# Patient Record
Sex: MALE | Race: WHITE | NOT HISPANIC OR LATINO | Employment: FULL TIME | URBAN - METROPOLITAN AREA
[De-identification: names, ages, dates, MRNs, and addresses within clinical notes are randomized per-mention and may not be internally consistent; named-entity substitution may affect disease eponyms.]

---

## 2019-07-18 ENCOUNTER — HOSPITAL ENCOUNTER (OUTPATIENT)
Dept: GENERAL RADIOLOGY | Facility: HOSPITAL | Age: 64
Discharge: HOME | End: 2019-07-18
Attending: NEUROLOGICAL SURGERY

## 2020-03-26 ENCOUNTER — HOSPITAL ENCOUNTER (OUTPATIENT)
Dept: URGENT CARE | Facility: CLINIC | Age: 65
Discharge: HOME | End: 2020-03-26
Attending: PHYSICIAN ASSISTANT

## 2020-10-30 ENCOUNTER — HOSPITAL ENCOUNTER (OUTPATIENT)
Dept: OTHER | Facility: HOSPITAL | Age: 65
Discharge: HOME | End: 2020-10-30
Attending: NEUROLOGICAL SURGERY

## 2020-10-30 LAB
ALBUMIN SERPL-MCNC: 4.5 G/DL (ref 3.5–5)
ALBUMIN/GLOB SERPL: 1.6 {RATIO} (ref 1.4–2.6)
ALP SERPL-CCNC: 36 U/L (ref 56–155)
ALT SERPL-CCNC: 27 U/L (ref 10–40)
ANION GAP SERPL CALC-SCNC: 16 MMOL/L (ref 8–19)
APPEARANCE UR: CLEAR
AST SERPL-CCNC: 21 U/L (ref 15–50)
BASOPHILS # BLD AUTO: 0.03 10*3/UL (ref 0–0.2)
BASOPHILS NFR BLD AUTO: 0.5 % (ref 0–3)
BILIRUB SERPL-MCNC: 0.31 MG/DL (ref 0.2–1.3)
BILIRUB UR QL: NEGATIVE
BUN SERPL-MCNC: 15 MG/DL (ref 5–25)
BUN/CREAT SERPL: 11 {RATIO} (ref 6–20)
CALCIUM SERPL-MCNC: 9.1 MG/DL (ref 8.7–10.4)
CHLORIDE SERPL-SCNC: 97 MMOL/L (ref 99–111)
COLOR UR: YELLOW
CONV ABS IMM GRAN: 0.04 10*3/UL (ref 0–0.2)
CONV CO2: 27 MMOL/L (ref 22–32)
CONV COLLECTION SOURCE (UA): ABNORMAL
CONV IMMATURE GRAN: 0.6 % (ref 0–1.8)
CONV TOTAL PROTEIN: 7.4 G/DL (ref 6.3–8.2)
CONV UROBILINOGEN IN URINE BY AUTOMATED TEST STRIP: 0.2 {EHRLICHU}/DL (ref 0.1–1)
CREAT UR-MCNC: 1.35 MG/DL (ref 0.7–1.2)
DEPRECATED RDW RBC AUTO: 37.3 FL (ref 35.1–43.9)
EOSINOPHIL # BLD AUTO: 0.36 10*3/UL (ref 0–0.7)
EOSINOPHIL # BLD AUTO: 5.8 % (ref 0–7)
ERYTHROCYTE [DISTWIDTH] IN BLOOD BY AUTOMATED COUNT: 12.4 % (ref 11.6–14.4)
EST. AVERAGE GLUCOSE BLD GHB EST-MCNC: 183 MG/DL
GFR SERPLBLD BASED ON 1.73 SQ M-ARVRAT: 54 ML/MIN/{1.73_M2}
GLOBULIN UR ELPH-MCNC: 2.9 G/DL (ref 2–3.5)
GLUCOSE SERPL-MCNC: 234 MG/DL (ref 70–99)
GLUCOSE UR QL: 250 MG/DL
HBA1C MFR BLD: 8 % (ref 3.5–5.7)
HCT VFR BLD AUTO: 39.7 % (ref 42–52)
HGB BLD-MCNC: 13.5 G/DL (ref 14–18)
HGB UR QL STRIP: NEGATIVE
KETONES UR QL STRIP: NEGATIVE MG/DL
LEUKOCYTE ESTERASE UR QL STRIP: NEGATIVE
LYMPHOCYTES # BLD AUTO: 1.7 10*3/UL (ref 1–5)
LYMPHOCYTES NFR BLD AUTO: 27.3 % (ref 20–45)
MCH RBC QN AUTO: 28.8 PG (ref 27–31)
MCHC RBC AUTO-ENTMCNC: 34 G/DL (ref 33–37)
MCV RBC AUTO: 84.6 FL (ref 80–96)
MONOCYTES # BLD AUTO: 0.52 10*3/UL (ref 0.2–1.2)
MONOCYTES NFR BLD AUTO: 8.4 % (ref 3–10)
NEUTROPHILS # BLD AUTO: 3.57 10*3/UL (ref 2–8)
NEUTROPHILS NFR BLD AUTO: 57.4 % (ref 30–85)
NITRITE UR QL STRIP: NEGATIVE
NRBC CBCN: 0 % (ref 0–0.7)
OSMOLALITY SERPL CALC.SUM OF ELEC: 290 MOSM/KG (ref 273–304)
PH UR STRIP.AUTO: 5.5 [PH] (ref 5–8)
PLATELET # BLD AUTO: 211 10*3/UL (ref 130–400)
PMV BLD AUTO: 10.6 FL (ref 9.4–12.4)
POTASSIUM SERPL-SCNC: 4.2 MMOL/L (ref 3.5–5.3)
PROT UR QL: NEGATIVE MG/DL
RBC # BLD AUTO: 4.69 10*6/UL (ref 4.7–6.1)
SODIUM SERPL-SCNC: 136 MMOL/L (ref 135–147)
SP GR UR: 1.01 (ref 1–1.03)
WBC # BLD AUTO: 6.22 10*3/UL (ref 4.8–10.8)

## 2021-03-19 ENCOUNTER — HOSPITAL ENCOUNTER (OUTPATIENT)
Dept: VACCINE CLINIC | Facility: HOSPITAL | Age: 66
Discharge: HOME | End: 2021-03-19
Attending: INTERNAL MEDICINE

## 2021-04-16 ENCOUNTER — HOSPITAL ENCOUNTER (OUTPATIENT)
Dept: VACCINE CLINIC | Facility: HOSPITAL | Age: 66
Discharge: HOME | End: 2021-04-16
Attending: INTERNAL MEDICINE

## 2021-06-25 ENCOUNTER — OFFICE VISIT (OUTPATIENT)
Dept: UROLOGY | Facility: CLINIC | Age: 66
End: 2021-06-25

## 2021-06-25 VITALS
HEIGHT: 70 IN | DIASTOLIC BLOOD PRESSURE: 45 MMHG | HEART RATE: 117 BPM | BODY MASS INDEX: 30.49 KG/M2 | SYSTOLIC BLOOD PRESSURE: 139 MMHG | TEMPERATURE: 97.8 F | WEIGHT: 213 LBS

## 2021-06-25 DIAGNOSIS — R33.9 URINARY RETENTION: Primary | ICD-10-CM

## 2021-06-25 PROBLEM — IMO0002 DIABETES MELLITUS TYPE II, UNCONTROLLED: Status: ACTIVE | Noted: 2021-03-11

## 2021-06-25 PROBLEM — M48.061 SPINAL STENOSIS OF LUMBAR REGION: Status: ACTIVE | Noted: 2020-01-29

## 2021-06-25 PROBLEM — M43.16 SPONDYLOLISTHESIS OF LUMBAR REGION: Status: ACTIVE | Noted: 2019-09-14

## 2021-06-25 PROBLEM — S33.5XXA LUMBAR SPRAIN: Status: ACTIVE | Noted: 2019-05-16

## 2021-06-25 LAB
BACTERIA UR QL AUTO: NORMAL /HPF
BILIRUB BLD-MCNC: NEGATIVE MG/DL
CLARITY, POC: CLEAR
COLOR UR: YELLOW
EPI CELLS #/AREA URNS HPF: 0 /[HPF]
GLUCOSE UR STRIP-MCNC: NEGATIVE MG/DL
KETONES UR QL: NEGATIVE
LEUKOCYTE EST, POC: NEGATIVE
NITRITE UR-MCNC: NEGATIVE MG/ML
PH UR: 6.5 [PH] (ref 5–8)
PROT UR STRIP-MCNC: NEGATIVE MG/DL
RBC # UR STRIP: NEGATIVE /UL
RBC # UR STRIP: NORMAL /HPF
RENAL EPITHELIAL, POC: 0
SP GR UR: 1.02 (ref 1–1.03)
UNIDENT CRYS URNS QL MICRO: NORMAL /HPF
UROBILINOGEN UR QL: NORMAL
WBC # UR STRIP: NORMAL /HPF

## 2021-06-25 PROCEDURE — 99203 OFFICE O/P NEW LOW 30 MIN: CPT | Performed by: UROLOGY

## 2021-06-25 PROCEDURE — 51702 INSERT TEMP BLADDER CATH: CPT | Performed by: UROLOGY

## 2021-06-25 PROCEDURE — 81003 URINALYSIS AUTO W/O SCOPE: CPT | Performed by: UROLOGY

## 2021-06-25 RX ORDER — ERGOCALCIFEROL 1.25 MG/1
50000 CAPSULE ORAL WEEKLY
COMMUNITY
Start: 2021-04-07

## 2021-06-25 RX ORDER — BLOOD SUGAR DIAGNOSTIC
STRIP MISCELLANEOUS
COMMUNITY
Start: 2021-04-27

## 2021-06-25 RX ORDER — LINAGLIPTIN 5 MG/1
5 TABLET, FILM COATED ORAL DAILY
COMMUNITY
Start: 2021-06-07

## 2021-06-25 RX ORDER — ACETAMINOPHEN 500 MG
1200 TABLET ORAL DAILY
COMMUNITY
Start: 2021-03-11

## 2021-06-25 RX ORDER — CYCLOBENZAPRINE HCL 10 MG
10 TABLET ORAL
COMMUNITY
Start: 2021-06-16

## 2021-06-25 RX ORDER — PRAVASTATIN SODIUM 80 MG/1
80 TABLET ORAL DAILY
COMMUNITY
Start: 2021-05-10

## 2021-06-25 RX ORDER — HYDROCODONE BITARTRATE AND ACETAMINOPHEN 10; 325 MG/1; MG/1
1 TABLET ORAL
COMMUNITY
Start: 2021-06-24 | End: 2021-07-24

## 2021-06-25 RX ORDER — TAMSULOSIN HYDROCHLORIDE 0.4 MG/1
0.4 CAPSULE ORAL
COMMUNITY
Start: 2021-06-17

## 2021-06-25 RX ORDER — LISINOPRIL 20 MG/1
20 TABLET ORAL DAILY
COMMUNITY
Start: 2021-04-27

## 2021-06-25 RX ORDER — CHLORAL HYDRATE 500 MG
CAPSULE ORAL
COMMUNITY

## 2021-06-25 NOTE — PROGRESS NOTES
"Chief Complaint  Urinary Retention.  BPH  Subjective     Patient is postop back surgery and is in excruciating pain.  Could not urinate and had a Mazariegos catheter        Darinel Garza presents to [facility]  History of Present Illness.    65-year-old white male underwent L4-5 S1 back operation 10 days ago and then went into urinary retention.  Never had this problem before.  Patient right now is sitting in chair and is in excruciating pain wants to lie down    Objective Patient is in excruciating pain.  Vital Signs:   /45 (BP Location: Right arm, Patient Position: Sitting, Cuff Size: Adult)   Pulse 117   Temp 97.8 °F (36.6 °C)   Ht 177.8 cm (70\")   Wt 96.6 kg (213 lb)   BMI 30.56 kg/m²     Allergies   Allergen Reactions   • Penicillins Other (See Comments)     Passed out after INJECTION as teenager, no rash, no reactive arthritis, no respiratory or cardiac symptoms. Does not appear to be allergic reaction.      Review of Systems   Constitutional: Positive for activity change and appetite change.        Patient is in pain   HENT: Negative.    Respiratory: Negative.    Cardiovascular: Negative.    Gastrointestinal: Negative.    Genitourinary:        Has a Mazariegos catheter in place   Musculoskeletal: Positive for back pain, gait problem and myalgias.   Skin: Negative.    Neurological: Positive for weakness and light-headedness.   Psychiatric/Behavioral: Negative for agitation, confusion, decreased concentration, self-injury, sleep disturbance and suicidal ideas. The patient is not hyperactive.         Physical Exam  Vitals and nursing note reviewed.   Constitutional:       Comments: Patient is in excruciating pain   HENT:      Head: Normocephalic and atraumatic.      Nose: Nose normal.   Cardiovascular:      Rate and Rhythm: Normal rate and regular rhythm.      Pulses: Normal pulses.      Heart sounds: Normal heart sounds. No murmur heard.   No friction rub.   Pulmonary:      " Effort: No respiratory distress.      Breath sounds: No wheezing, rhonchi or rales.   Abdominal:      General: There is no distension.      Palpations: There is no mass.      Tenderness: There is no abdominal tenderness. There is no right CVA tenderness, left CVA tenderness, guarding or rebound.      Hernia: No hernia is present.   Musculoskeletal:      Comments: Recent back operation and incision is healing well.  Is in the midline of the back from about S2 to L 3   Psychiatric:         Behavior: Behavior normal.         Judgment: Judgment normal.     Patient is in pain and obviously depressed    Patient is uncircumcised penis.  Testicles are normal and so is scrotum.    Prostate gland is just about 25 g and benign  Result Review :            We gave him a trial of catheter removal but the patient could not urinate and we had to reinsert 16 Amzariegos catheter in the urinary bladder.  Going to see him again in 10 days for cystoscopy.     Assessment and Plan    Diagnoses and all orders for this visit:    1. Urinary retention (Primary)  -     POC Urinalysis Dipstick, Automated    BPH    Follow Up   No follow-ups on file.  Patient was given instructions and counseling regarding his condition or for health maintenance advice. Please see specific information pulled into the AVS if appropriate.     Lm Álvarez MD

## 2021-07-02 RX ORDER — AMOXICILLIN 250 MG
1 CAPSULE ORAL
COMMUNITY
Start: 2021-06-16

## 2021-07-02 RX ORDER — FENOFIBRATE 160 MG/1
160 TABLET ORAL DAILY
COMMUNITY
Start: 2021-05-14

## 2021-07-02 RX ORDER — TRIAMCINOLONE ACETONIDE 1 MG/G
CREAM TOPICAL
COMMUNITY
Start: 2021-01-06

## 2021-07-02 RX ORDER — LANCETS 30 GAUGE
1 EACH MISCELLANEOUS 4 TIMES DAILY
COMMUNITY
Start: 2021-03-19

## 2021-07-02 RX ORDER — HYDROCHLOROTHIAZIDE 25 MG/1
25 TABLET ORAL DAILY
COMMUNITY
Start: 2021-04-25

## 2021-07-07 ENCOUNTER — OFFICE VISIT (OUTPATIENT)
Dept: UROLOGY | Facility: CLINIC | Age: 66
End: 2021-07-07

## 2021-07-07 ENCOUNTER — PREP FOR SURGERY (OUTPATIENT)
Dept: OTHER | Facility: HOSPITAL | Age: 66
End: 2021-07-07

## 2021-07-07 DIAGNOSIS — N40.1 URINARY RETENTION DUE TO BENIGN PROSTATIC HYPERPLASIA: Primary | ICD-10-CM

## 2021-07-07 DIAGNOSIS — R33.8 URINARY RETENTION DUE TO BENIGN PROSTATIC HYPERPLASIA: Primary | ICD-10-CM

## 2021-07-07 DIAGNOSIS — R33.9 URINARY RETENTION: Primary | ICD-10-CM

## 2021-07-07 DIAGNOSIS — N13.8 BPH WITH OBSTRUCTION/LOWER URINARY TRACT SYMPTOMS: ICD-10-CM

## 2021-07-07 DIAGNOSIS — N40.1 BPH WITH OBSTRUCTION/LOWER URINARY TRACT SYMPTOMS: ICD-10-CM

## 2021-07-07 PROCEDURE — 52000 CYSTOURETHROSCOPY: CPT | Performed by: UROLOGY

## 2021-07-07 RX ORDER — SODIUM CHLORIDE 0.9 % (FLUSH) 0.9 %
10 SYRINGE (ML) INJECTION EVERY 12 HOURS SCHEDULED
Status: CANCELLED | OUTPATIENT
Start: 2021-07-07

## 2021-07-07 RX ORDER — SODIUM CHLORIDE 0.9 % (FLUSH) 0.9 %
10 SYRINGE (ML) INJECTION AS NEEDED
Status: CANCELLED | OUTPATIENT
Start: 2021-07-07

## 2021-07-07 RX ORDER — ONDANSETRON 2 MG/ML
4 INJECTION INTRAMUSCULAR; INTRAVENOUS EVERY 6 HOURS PRN
Status: CANCELLED | OUTPATIENT
Start: 2021-07-07

## 2021-07-07 RX ORDER — SODIUM CHLORIDE, SODIUM LACTATE, POTASSIUM CHLORIDE, CALCIUM CHLORIDE 600; 310; 30; 20 MG/100ML; MG/100ML; MG/100ML; MG/100ML
100 INJECTION, SOLUTION INTRAVENOUS CONTINUOUS
Status: CANCELLED | OUTPATIENT
Start: 2021-07-07

## 2021-07-07 NOTE — PROGRESS NOTES
Patient could not urinate on the uroflow machine.  We will do ultrasound residual and then reinsert Mazariegos catheter.  Probably have to do a button TURP on the patient

## 2021-07-07 NOTE — PROGRESS NOTES
Cystoscopy    Date/Time: 7/7/2021 2:05 PM  Performed by: Lm Álvarez MD  Authorized by: Lm Álvarez MD   Preparation: Patient was prepped and draped in the usual sterile fashion.  Local anesthesia used: yes    Anesthesia:  Local anesthesia used: yes  Local Anesthetic: lidocaine spray  Anesthetic total: 10 mL    Sedation:  Patient sedated: no             Patient was placed in lithotomy position.  Thorough scrubbing her lower abdomen external genitalia was performed with Hibiclens.  18 Olympus flexible cystoscope was inserted into the urethra which was normal.  Prostate gland is large and obstructive.  Patient has large lateral lobes .  Bladder is trabeculated.  Both ureteral orifices are normal.  Bladder was looked at carefully and I do not see any bladder tumors.  There was no evidence of vesicocolic fistula.  Patient has inflammation of bladder wall with hemorrhages in the wall.  Flexible cystoscope was removed and patient tolerated the procedure very well .  We will do uroflow and ultrasound residual

## 2023-12-08 ENCOUNTER — TRANSCRIBE ORDERS (OUTPATIENT)
Dept: ADMINISTRATIVE | Facility: HOSPITAL | Age: 68
End: 2023-12-08
Payer: MEDICARE

## 2023-12-08 ENCOUNTER — HOSPITAL ENCOUNTER (OUTPATIENT)
Dept: GENERAL RADIOLOGY | Facility: HOSPITAL | Age: 68
Discharge: HOME | End: 2023-12-08
Admitting: PHYSICIAN ASSISTANT
Payer: MEDICARE

## 2023-12-08 DIAGNOSIS — R05.1 ACUTE COUGH: ICD-10-CM

## 2023-12-08 DIAGNOSIS — R05.1 ACUTE COUGH: Primary | ICD-10-CM

## 2023-12-08 PROCEDURE — 71046 X-RAY EXAM CHEST 2 VIEWS: CPT

## 2023-12-18 ENCOUNTER — HOSPITAL ENCOUNTER (OUTPATIENT)
Dept: GENERAL RADIOLOGY | Facility: HOSPITAL | Age: 68
Discharge: HOME | End: 2023-12-18
Admitting: PHYSICIAN ASSISTANT
Payer: MEDICARE

## 2023-12-18 ENCOUNTER — TRANSCRIBE ORDERS (OUTPATIENT)
Dept: GENERAL RADIOLOGY | Facility: HOSPITAL | Age: 68
End: 2023-12-18
Payer: MEDICARE

## 2023-12-18 DIAGNOSIS — J69.0 PNEUMONITIS, ASPIRATION: ICD-10-CM

## 2023-12-18 DIAGNOSIS — J69.0 PNEUMONITIS, ASPIRATION: Primary | ICD-10-CM

## 2023-12-18 PROCEDURE — 71046 X-RAY EXAM CHEST 2 VIEWS: CPT

## 2024-09-23 ENCOUNTER — TELEPHONE (OUTPATIENT)
Dept: GASTROENTEROLOGY | Facility: CLINIC | Age: 69
End: 2024-09-23
Payer: MEDICARE

## 2024-10-16 ENCOUNTER — PREP FOR SURGERY (OUTPATIENT)
Dept: OTHER | Facility: HOSPITAL | Age: 69
End: 2024-10-16
Payer: MEDICARE

## 2024-10-16 ENCOUNTER — CLINICAL SUPPORT (OUTPATIENT)
Dept: GASTROENTEROLOGY | Facility: CLINIC | Age: 69
End: 2024-10-16
Payer: MEDICARE

## 2024-10-16 DIAGNOSIS — Z12.11 ENCOUNTER FOR SCREENING FOR MALIGNANT NEOPLASM OF COLON: Primary | ICD-10-CM

## 2024-10-16 RX ORDER — MELOXICAM 7.5 MG/1
1 TABLET ORAL DAILY
COMMUNITY
Start: 2024-06-25

## 2024-10-16 NOTE — PROGRESS NOTES
Darinel Garza  1955  69 y.o.    Reason for call: Screening Colonoscopy  Prep prescribed: LABS pcp waiting to send LV prep  Prep instructions reviewed with patient and sent to patient via regular mail to the home address on file  Is the patient currently on any injectable or oral medications for weight loss or diabetes? No  Clearance needed? No  If yes, what clearance is needed? N/A  Clearance has been requested from n/a  The patient has been scheduled for: Colonoscopy  After your procedure, you will be contacted with results. Please confirm the best phone # to reach the patient: [phone]  Family history of colon cancer? No  If yes, indicate relative: na  Tentative Procedure Date: 11/21/2024    Family History   Problem Relation Age of Onset    Colon cancer Neg Hx      Past Medical History:   Diagnosis Date    Hypertension     Urinary retention      Allergies   Allergen Reactions    Penicillins Other (See Comments)     Passed out after INJECTION as teenager, no rash, no reactive arthritis, no respiratory or cardiac symptoms. Does not appear to be allergic reaction.     Past Surgical History:   Procedure Laterality Date    BACK SURGERY       Social History     Socioeconomic History    Marital status:    Tobacco Use    Smoking status: Never    Smokeless tobacco: Never   Vaping Use    Vaping status: Never Used   Substance and Sexual Activity    Alcohol use: Yes     Comment: rare       Current Outpatient Medications:     Calcium Carbonate-Vit D-Min (Calcium 1200) 5202-2036 MG-UNIT chewable tablet, Chew 1,200 mg Daily., Disp: , Rfl:     cyclobenzaprine (FLEXERIL) 10 MG tablet, 1 tablet., Disp: , Rfl:     fenofibrate 160 MG tablet, Take 1 tablet by mouth Daily., Disp: , Rfl:     hydroCHLOROthiazide (HYDRODIURIL) 25 MG tablet, Take 1 tablet by mouth Daily., Disp: , Rfl:     lisinopril (PRINIVIL,ZESTRIL) 20 MG tablet, Take 1 tablet by mouth Daily., Disp: , Rfl:     meloxicam (MOBIC) 7.5 MG tablet,  Take 1 tablet by mouth Daily., Disp: , Rfl:     metFORMIN (GLUCOPHAGE) 500 MG tablet, Take 2 tablets by mouth 2 (Two) Times a Day With Meals., Disp: , Rfl:     Omega-3 Fatty Acids (fish oil) 1000 MG capsule capsule, Take  by mouth., Disp: , Rfl:     OneTouch Delica Lancets 30G misc, Inject 1 each under the skin into the appropriate area as directed 4 (Four) Times a Day., Disp: , Rfl:     OneTouch Verio test strip, CHECK BLOOD GLUCOSE FOUR TIMES DAILY, Disp: , Rfl:     pravastatin (PRAVACHOL) 80 MG tablet, Take 1 tablet by mouth Daily., Disp: , Rfl:     sennosides-docusate (PERICOLACE) 8.6-50 MG per tablet, Take 1 tablet by mouth., Disp: , Rfl:     tamsulosin (FLOMAX) 0.4 MG capsule 24 hr capsule, 1 capsule., Disp: , Rfl:     Tradjenta 5 MG tablet tablet, Take 1 tablet by mouth Daily., Disp: , Rfl:     triamcinolone (KENALOG) 0.1 % cream, APPLY EXTERNALLY TO THE AFFECTED AREA TWICE DAILY AS NEEDED FOR ITCHING UNTIL CLEAR, Disp: , Rfl:     vitamin D (ERGOCALCIFEROL) 1.25 MG (55515 UT) capsule capsule, Take 1 capsule by mouth 1 (One) Time Per Week., Disp: , Rfl:

## 2024-11-12 NOTE — PAT
Left message on voicemail with arrival time of  0630    Bring picture ID and insurance card, have  over 18 to give ride home.     Bring medication list but do not take any meds except inhalers that morning. Bring medications with you to the hospital, including inhalers.     Complete prep prior to arrival and call [phone] with any questions.     Come to [facility].

## 2024-11-19 ENCOUNTER — ANESTHESIA EVENT (OUTPATIENT)
Dept: GASTROENTEROLOGY | Facility: HOSPITAL | Age: 69
End: 2024-11-19
Payer: MEDICARE

## 2024-11-19 ENCOUNTER — TELEPHONE (OUTPATIENT)
Dept: GASTROENTEROLOGY | Facility: CLINIC | Age: 69
End: 2024-11-19
Payer: MEDICARE

## 2024-11-19 NOTE — ANESTHESIA PREPROCEDURE EVALUATION
Anesthesia Evaluation     Patient summary reviewed and Nursing notes reviewed   NPO Solid Status: > 8 hours  NPO Liquid Status: > 4 hours           Airway   Mallampati: II  TM distance: >3 FB  Neck ROM: full  No difficulty expected  Dental - normal exam     Pulmonary - normal exam    breath sounds clear to auscultation  Cardiovascular - normal exam  Exercise tolerance: good (4-7 METS)    ECG reviewed  Rhythm: regular  Rate: normal    (+) hypertension well controlled less than 2 medications, hyperlipidemia      Neuro/Psych  (+) numbness (resolved)  GI/Hepatic/Renal/Endo    (+) diabetes mellitus type 2 well controlled    Musculoskeletal     (+) neck pain (relates mobility fine just hurts once in a while)  Abdominal     Abdomen: soft.   Substance History - negative use     OB/GYN          Other        ROS/Med Hx Other: Screening    EKG 10/30/20: HR 70, SR, abnormal R wave progression, early transition, atrial premature complex                      Anesthesia Plan    ASA 2     general   total IV anesthesia  (Total IV Anesthesia    Patient understands anesthesia not responsible for dental damage.  )  intravenous induction     Anesthetic plan, risks, benefits, and alternatives have been provided, discussed and informed consent has been obtained with: patient.  Pre-procedure education provided  Plan discussed with CRNA.      CODE STATUS:

## 2024-11-19 NOTE — TELEPHONE ENCOUNTER
Pt called - has not received bowel prep - Bowel Prep sent to [facility] - verbalized instructions and sent bowel prep instructions via [employer]

## 2024-11-21 ENCOUNTER — HOSPITAL ENCOUNTER (OUTPATIENT)
Facility: HOSPITAL | Age: 69
Setting detail: HOSPITAL OUTPATIENT SURGERY
Discharge: HOME | End: 2024-11-21
Attending: INTERNAL MEDICINE | Admitting: INTERNAL MEDICINE
Payer: MEDICARE

## 2024-11-21 ENCOUNTER — ANESTHESIA (OUTPATIENT)
Dept: GASTROENTEROLOGY | Facility: HOSPITAL | Age: 69
End: 2024-11-21
Payer: MEDICARE

## 2024-11-21 VITALS
HEIGHT: 70 IN | HEART RATE: 68 BPM | TEMPERATURE: 98.6 F | SYSTOLIC BLOOD PRESSURE: 124 MMHG | BODY MASS INDEX: 29.16 KG/M2 | WEIGHT: 203.71 LBS | OXYGEN SATURATION: 95 % | RESPIRATION RATE: 16 BRPM | DIASTOLIC BLOOD PRESSURE: 78 MMHG

## 2024-11-21 LAB — GLUCOSE BLDC GLUCOMTR-MCNC: 203 MG/DL (ref 70–99)

## 2024-11-21 PROCEDURE — 25010000002 LIDOCAINE PF 2% 2 % SOLUTION

## 2024-11-21 PROCEDURE — G0121 COLON CA SCRN NOT HI RSK IND: HCPCS | Performed by: INTERNAL MEDICINE

## 2024-11-21 PROCEDURE — 82948 REAGENT STRIP/BLOOD GLUCOSE: CPT

## 2024-11-21 PROCEDURE — 25010000002 PROPOFOL 10 MG/ML EMULSION

## 2024-11-21 RX ORDER — PROPOFOL 10 MG/ML
VIAL (ML) INTRAVENOUS AS NEEDED
Status: DISCONTINUED | OUTPATIENT
Start: 2024-11-21 | End: 2024-11-21 | Stop reason: SURG

## 2024-11-21 RX ORDER — LIDOCAINE HYDROCHLORIDE 20 MG/ML
INJECTION, SOLUTION EPIDURAL; INFILTRATION; INTRACAUDAL; PERINEURAL AS NEEDED
Status: DISCONTINUED | OUTPATIENT
Start: 2024-11-21 | End: 2024-11-21 | Stop reason: SURG

## 2024-11-21 RX ORDER — SODIUM CHLORIDE, SODIUM LACTATE, POTASSIUM CHLORIDE, CALCIUM CHLORIDE 600; 310; 30; 20 MG/100ML; MG/100ML; MG/100ML; MG/100ML
30 INJECTION, SOLUTION INTRAVENOUS CONTINUOUS
Status: DISCONTINUED | OUTPATIENT
Start: 2024-11-21 | End: 2024-11-21 | Stop reason: HOSPADM

## 2024-11-21 RX ADMIN — LIDOCAINE HYDROCHLORIDE 40 MG: 20 INJECTION, SOLUTION INTRAVENOUS at 08:24

## 2024-11-21 RX ADMIN — PROPOFOL 100 MG: 10 INJECTION, EMULSION INTRAVENOUS at 08:24

## 2024-11-21 RX ADMIN — PROPOFOL 150 MCG/KG/MIN: 10 INJECTION, EMULSION INTRAVENOUS at 08:24

## 2024-11-21 NOTE — ANESTHESIA POSTPROCEDURE EVALUATION
Patient: Darinel Garza    Procedure Summary       Date: 11/21/24 Room / Location: [facility] ENDOSCOPY 1 / [facility] ENDOSCOPY    Anesthesia Start: 0820 Anesthesia Stop: 0849    Procedure: COLONOSCOPY Diagnosis:       Encounter for screening for malignant neoplasm of colon      (Encounter for screening for malignant neoplasm of colon [Z12.11])    Surgeons: Richard Lopez MD Provider: Don Prather CRNA    Anesthesia Type: general ASA Status: 2            Anesthesia Type: general    Vitals  Vitals Value Taken Time   /78 11/21/24 0903   Temp 37 °C (98.6 °F) 11/21/24 0903   Pulse 67 11/21/24 0904   Resp 16 11/21/24 0903   SpO2 96 % 11/21/24 0904   Vitals shown include unfiled device data.        Post Anesthesia Care and Evaluation    Post-procedure mental status: acceptable.  Pain management: satisfactory to patient    Airway patency: patent  Anesthetic complications: No anesthetic complications    Cardiovascular status: acceptable  Respiratory status: acceptable    Comments: Per chart review

## 2024-11-21 NOTE — H&P
Pre Procedure History & Physical    Chief Complaint: Colonoscopy    HPI: Patient is 69 years old male presented today for open access average risk  screening colonoscopy.  He had last colonoscopy about 10 years ago that according to him was reported normal.  No family history of colorectal cancer or colon polyps.  Patient denies intake of blood thinner, anticoagulants or NSAIDs medications    Past Medical History:   Past Medical History:   Diagnosis Date    Diabetes mellitus     Hypertension     Urinary retention        Past Surgical History:  Past Surgical History:   Procedure Laterality Date    BACK SURGERY         Family History:  Family History   Problem Relation Age of Onset    Colon cancer Neg Hx        Social History:   reports that he has never smoked. He has never used smokeless tobacco. He reports current alcohol use.    Medications:   Medications Prior to Admission   Medication Sig Dispense Refill Last Dose/Taking    fenofibrate 160 MG tablet Take 1 tablet by mouth Daily.   11/20/2024 Morning    hydroCHLOROthiazide (HYDRODIURIL) 25 MG tablet Take 1 tablet by mouth Daily.   11/20/2024 Morning    lisinopril (PRINIVIL,ZESTRIL) 20 MG tablet Take 1 tablet by mouth Daily.   11/20/2024 Morning    metFORMIN (GLUCOPHAGE) 500 MG tablet Take 2 tablets by mouth 2 (Two) Times a Day With Meals.   11/20/2024 Morning    OneTouch Delica Lancets 30G misc Inject 1 each under the skin into the appropriate area as directed 4 (Four) Times a Day.   11/20/2024 Morning    OneTouch Verio test strip CHECK BLOOD GLUCOSE FOUR TIMES DAILY   11/20/2024    pravastatin (PRAVACHOL) 80 MG tablet Take 1 tablet by mouth Daily.   11/20/2024 Morning    tamsulosin (FLOMAX) 0.4 MG capsule 24 hr capsule 1 capsule.   11/20/2024 Morning    Tradjenta 5 MG tablet tablet Take 1 tablet by mouth Daily.   11/20/2024 Morning    vitamin D (ERGOCALCIFEROL) 1.25 MG (81637 UT) capsule capsule Take 1 capsule by mouth 1 (One) Time Per Week.   11/20/2024 Morning  "      Allergies:  Penicillins      Objective     Blood pressure 158/82, pulse 100, temperature 97.8 °F (36.6 °C), temperature source Temporal, resp. rate 14, height 177.8 cm (70\"), weight 92.4 kg (203 lb 11.3 oz), SpO2 96%.    Physical Exam   Constitutional: Pt is oriented to person, place, and time and well-developed, well-nourished, and in no distress.   Mouth/Throat: Oropharynx is clear and moist.   Neck: Normal range of motion.   Cardiovascular: Normal rate, regular rhythm and normal heart sounds.    Pulmonary/Chest: Effort normal and breath sounds normal.   Abdominal: Soft. Nontender  Skin: Skin is warm and dry.   Psychiatric: Mood, memory, affect and judgment normal.     Assessment & Plan     Diagnosis:    Average risk screening colonoscopy    Anticipated Surgical Procedure:    Colonoscopy    The risks, benefits, and alternatives of this procedure have been discussed with the patient or the responsible party- the patient understands and agrees to proceed.        Electronically signed by Richard Lopez MD, 11/21/24, 8:17 AM EST.            "

## 2024-11-25 ENCOUNTER — TELEPHONE (OUTPATIENT)
Dept: GASTROENTEROLOGY | Facility: CLINIC | Age: 69
End: 2024-11-25
Payer: MEDICARE

## 2025-04-18 ENCOUNTER — APPOINTMENT (OUTPATIENT)
Dept: GENERAL RADIOLOGY | Facility: HOSPITAL | Age: 70
End: 2025-04-18
Payer: MEDICARE

## 2025-04-18 ENCOUNTER — HOSPITAL ENCOUNTER (EMERGENCY)
Facility: HOSPITAL | Age: 70
Discharge: HOME | End: 2025-04-18
Attending: EMERGENCY MEDICINE
Payer: MEDICARE

## 2025-04-18 VITALS
HEART RATE: 70 BPM | OXYGEN SATURATION: 95 % | BODY MASS INDEX: 29.23 KG/M2 | DIASTOLIC BLOOD PRESSURE: 62 MMHG | SYSTOLIC BLOOD PRESSURE: 118 MMHG | RESPIRATION RATE: 14 BRPM | HEIGHT: 70 IN | WEIGHT: 204.15 LBS | TEMPERATURE: 98.2 F

## 2025-04-18 DIAGNOSIS — R07.9 NONSPECIFIC CHEST PAIN: Primary | ICD-10-CM

## 2025-04-18 LAB
ALBUMIN SERPL-MCNC: 4 G/DL (ref 3.5–5.2)
ALBUMIN/GLOB SERPL: 1.3 G/DL
ALP SERPL-CCNC: 36 U/L (ref 39–117)
ALT SERPL W P-5'-P-CCNC: 14 U/L (ref 1–41)
ANION GAP SERPL CALCULATED.3IONS-SCNC: 10.4 MMOL/L (ref 5–15)
AST SERPL-CCNC: 16 U/L (ref 1–40)
BASOPHILS # BLD AUTO: 0.03 10*3/MM3 (ref 0–0.2)
BASOPHILS NFR BLD AUTO: 0.4 % (ref 0–1.5)
BILIRUB SERPL-MCNC: 0.2 MG/DL (ref 0–1.2)
BUN SERPL-MCNC: 25 MG/DL (ref 8–23)
BUN/CREAT SERPL: 17.1 (ref 7–25)
CALCIUM SPEC-SCNC: 8.8 MG/DL (ref 8.6–10.5)
CHLORIDE SERPL-SCNC: 101 MMOL/L (ref 98–107)
CO2 SERPL-SCNC: 23.6 MMOL/L (ref 22–29)
CREAT SERPL-MCNC: 1.46 MG/DL (ref 0.76–1.27)
DEPRECATED RDW RBC AUTO: 42.5 FL (ref 37–54)
EGFRCR SERPLBLD CKD-EPI 2021: 51.1 ML/MIN/1.73
EOSINOPHIL # BLD AUTO: 0.4 10*3/MM3 (ref 0–0.4)
EOSINOPHIL NFR BLD AUTO: 5.2 % (ref 0.3–6.2)
ERYTHROCYTE [DISTWIDTH] IN BLOOD BY AUTOMATED COUNT: 13.4 % (ref 12.3–15.4)
GEN 5 1HR TROPONIN T REFLEX: 19 NG/L
GLOBULIN UR ELPH-MCNC: 3 GM/DL
GLUCOSE SERPL-MCNC: 96 MG/DL (ref 65–99)
HCT VFR BLD AUTO: 38.5 % (ref 37.5–51)
HGB BLD-MCNC: 12.8 G/DL (ref 13–17.7)
HOLD SPECIMEN: NORMAL
HOLD SPECIMEN: NORMAL
IMM GRANULOCYTES # BLD AUTO: 0.03 10*3/MM3 (ref 0–0.05)
IMM GRANULOCYTES NFR BLD AUTO: 0.4 % (ref 0–0.5)
LIPASE SERPL-CCNC: 75 U/L (ref 13–60)
LYMPHOCYTES # BLD AUTO: 2.01 10*3/MM3 (ref 0.7–3.1)
LYMPHOCYTES NFR BLD AUTO: 25.9 % (ref 19.6–45.3)
MAGNESIUM SERPL-MCNC: 1.7 MG/DL (ref 1.6–2.4)
MCH RBC QN AUTO: 29 PG (ref 26.6–33)
MCHC RBC AUTO-ENTMCNC: 33.2 G/DL (ref 31.5–35.7)
MCV RBC AUTO: 87.3 FL (ref 79–97)
MONOCYTES # BLD AUTO: 0.62 10*3/MM3 (ref 0.1–0.9)
MONOCYTES NFR BLD AUTO: 8 % (ref 5–12)
NEUTROPHILS NFR BLD AUTO: 4.67 10*3/MM3 (ref 1.7–7)
NEUTROPHILS NFR BLD AUTO: 60.1 % (ref 42.7–76)
NRBC BLD AUTO-RTO: 0 /100 WBC (ref 0–0.2)
NT-PROBNP SERPL-MCNC: 52.5 PG/ML (ref 0–900)
PLATELET # BLD AUTO: 220 10*3/MM3 (ref 140–450)
PMV BLD AUTO: 10.1 FL (ref 6–12)
POTASSIUM SERPL-SCNC: 4.4 MMOL/L (ref 3.5–5.2)
PROT SERPL-MCNC: 7 G/DL (ref 6–8.5)
QT INTERVAL: 345 MS
QTC INTERVAL: 396 MS
RBC # BLD AUTO: 4.41 10*6/MM3 (ref 4.14–5.8)
SODIUM SERPL-SCNC: 135 MMOL/L (ref 136–145)
TROPONIN T NUMERIC DELTA: 0 NG/L
TROPONIN T SERPL HS-MCNC: 19 NG/L
WBC NRBC COR # BLD AUTO: 7.76 10*3/MM3 (ref 3.4–10.8)
WHOLE BLOOD HOLD COAG: NORMAL
WHOLE BLOOD HOLD SPECIMEN: NORMAL

## 2025-04-18 PROCEDURE — 99284 EMERGENCY DEPT VISIT MOD MDM: CPT

## 2025-04-18 PROCEDURE — 71045 X-RAY EXAM CHEST 1 VIEW: CPT

## 2025-04-18 PROCEDURE — 83880 ASSAY OF NATRIURETIC PEPTIDE: CPT

## 2025-04-18 PROCEDURE — 85025 COMPLETE CBC W/AUTO DIFF WBC: CPT

## 2025-04-18 PROCEDURE — 83690 ASSAY OF LIPASE: CPT

## 2025-04-18 PROCEDURE — 36415 COLL VENOUS BLD VENIPUNCTURE: CPT

## 2025-04-18 PROCEDURE — 80053 COMPREHEN METABOLIC PANEL: CPT

## 2025-04-18 PROCEDURE — 93005 ELECTROCARDIOGRAM TRACING: CPT | Performed by: EMERGENCY MEDICINE

## 2025-04-18 PROCEDURE — 93005 ELECTROCARDIOGRAM TRACING: CPT

## 2025-04-18 PROCEDURE — 84484 ASSAY OF TROPONIN QUANT: CPT | Performed by: EMERGENCY MEDICINE

## 2025-04-18 PROCEDURE — 83735 ASSAY OF MAGNESIUM: CPT

## 2025-04-18 PROCEDURE — 84484 ASSAY OF TROPONIN QUANT: CPT

## 2025-04-18 RX ORDER — ASPIRIN 81 MG/1
324 TABLET, CHEWABLE ORAL ONCE
Status: COMPLETED | OUTPATIENT
Start: 2025-04-18 | End: 2025-04-18

## 2025-04-18 RX ORDER — LANOLIN ALCOHOL/MO/W.PET/CERES
1 CREAM (GRAM) TOPICAL DAILY
COMMUNITY
Start: 2025-03-26

## 2025-04-18 RX ORDER — SODIUM CHLORIDE 0.9 % (FLUSH) 0.9 %
10 SYRINGE (ML) INJECTION AS NEEDED
Status: DISCONTINUED | OUTPATIENT
Start: 2025-04-18 | End: 2025-04-19 | Stop reason: HOSPADM

## 2025-04-18 RX ADMIN — ASPIRIN 324 MG: 81 TABLET, CHEWABLE ORAL at 20:24

## 2025-04-19 NOTE — ED PROVIDER NOTES
Time: 10:41 PM EDT  Date of encounter:  4/18/2025  Independent Historian/Clinical History and Information was obtained by:   Patient    History is limited by: N/A    Chief Complaint: Chest pain      History of Present Illness:  Patient is a 69 y.o. year old male who presents to the emergency department for evaluation of chest pain.  Patient complains of a generalized chest pain shooting down both of his arms for the past 1 to 2 weeks.  Patient states his symptoms are nonexertional and are improved throughout the day.  His symptoms seem to be worse at night when he is laying down.  He denies any specific reflux symptoms.  Does admit to having anxiety and states this could be the cause.      Patient Care Team  Primary Care Provider: Maria Del Carmen Brownlee PA-C    Past Medical History:     Allergies   Allergen Reactions    Penicillins Other (See Comments)     Passed out after INJECTION as teenager, no rash, no reactive arthritis, no respiratory or cardiac symptoms. Does not appear to be allergic reaction.     Past Medical History:   Diagnosis Date    Diabetes mellitus     Hypertension     Urinary retention      Past Surgical History:   Procedure Laterality Date    BACK SURGERY      COLONOSCOPY N/A 11/21/2024    Procedure: COLONOSCOPY;  Surgeon: Richard Lopez MD;  Location: [facility] ENDOSCOPY;  Service: Gastroenterology;  Laterality: N/A;  HEMORRHOIDS    FRACTURE SURGERY      NECK SURGERY       Family History   Problem Relation Age of Onset    Colon cancer Neg Hx        Home Medications:  Prior to Admission medications    Medication Sig Start Date End Date Taking? Authorizing Provider   Magnesium Oxide -Mg Supplement 400 (240 Mg) MG tablet Take 1 tablet by mouth Daily. 3/26/25  Yes Nikky Hicks MD   fenofibrate 160 MG tablet Take 1 tablet by mouth Daily. 5/14/21   Nikky Hicks MD   hydroCHLOROthiazide (HYDRODIURIL) 25 MG tablet Take 1 tablet by mouth Daily. 4/25/21   Nikky Hicks MD   lisinopril  (PRINIVIL,ZESTRIL) 20 MG tablet Take 1 tablet by mouth Daily. 4/27/21   Nikky Hicks MD   metFORMIN (GLUCOPHAGE) 500 MG tablet Take 2 tablets by mouth 2 (Two) Times a Day With Meals. 5/10/21   Provider, Historical, MD   OneTouch Delica Lancets 30G misc Inject 1 each under the skin into the appropriate area as directed 4 (Four) Times a Day. 3/19/21   Provider, Historical, MD   OneTouch Verio test strip CHECK BLOOD GLUCOSE FOUR TIMES DAILY 4/27/21   Nikky Hicks MD   pravastatin (PRAVACHOL) 80 MG tablet Take 1 tablet by mouth Daily. 5/10/21   Nikky Hicks MD   tamsulosin (FLOMAX) 0.4 MG capsule 24 hr capsule 1 capsule. 6/17/21   Nikky Hicks MD   Tradjenta 5 MG tablet tablet Take 1 tablet by mouth Daily. 6/7/21   Nikky Hicks MD   vitamin D (ERGOCALCIFEROL) 1.25 MG (83459 UT) capsule capsule Take 1 capsule by mouth 1 (One) Time Per Week. 4/7/21   Nikky Hicks MD        Social History:   Social History     Tobacco Use    Smoking status: Never    Smokeless tobacco: Never   Vaping Use    Vaping status: Never Used   Substance Use Topics    Alcohol use: Yes     Comment: rare         Review of Systems:  Review of Systems   Constitutional:  Negative for chills and fever.   HENT:  Negative for congestion, rhinorrhea and sore throat.    Eyes:  Negative for photophobia.   Respiratory:  Negative for apnea, cough, chest tightness and shortness of breath.    Cardiovascular:  Positive for chest pain. Negative for palpitations.   Gastrointestinal:  Negative for abdominal pain, diarrhea, nausea and vomiting.   Endocrine: Negative.    Genitourinary:  Negative for difficulty urinating and dysuria.   Musculoskeletal:  Negative for back pain, joint swelling and myalgias.   Skin:  Negative for color change and wound.   Allergic/Immunologic: Negative.    Neurological:  Negative for seizures and headaches.   Psychiatric/Behavioral:  The patient is nervous/anxious.    All other systems  "reviewed and are negative.       Physical Exam:  /62 (BP Location: Right arm, Patient Position: Lying)   Pulse 70   Temp 98.2 °F (36.8 °C)   Resp 14   Ht 177.8 cm (70\")   Wt 92.6 kg (204 lb 2.3 oz)   SpO2 95%   BMI 29.29 kg/m²     Physical Exam  Vitals and nursing note reviewed.   Constitutional:       General: He is awake.      Appearance: Normal appearance. He is well-developed.   HENT:      Head: Normocephalic and atraumatic.      Nose: Nose normal.      Mouth/Throat:      Mouth: Mucous membranes are moist.   Eyes:      Extraocular Movements: Extraocular movements intact.      Pupils: Pupils are equal, round, and reactive to light.   Cardiovascular:      Rate and Rhythm: Normal rate and regular rhythm.      Heart sounds: Normal heart sounds.   Pulmonary:      Effort: Pulmonary effort is normal. No respiratory distress.      Breath sounds: Normal breath sounds. No wheezing, rhonchi or rales.   Abdominal:      General: Bowel sounds are normal.      Palpations: Abdomen is soft.      Tenderness: There is no abdominal tenderness. There is no guarding or rebound.      Comments: No rigidity   Musculoskeletal:         General: No tenderness. Normal range of motion.      Cervical back: Normal range of motion and neck supple.   Skin:     General: Skin is warm and dry.      Coloration: Skin is not jaundiced.   Neurological:      General: No focal deficit present.      Mental Status: He is alert. Mental status is at baseline.   Psychiatric:         Mood and Affect: Mood normal.                    Medical Decision Making:      Comorbidities that affect care:    Hypertension, diabetes    External Notes reviewed:    Previous Clinic Note: Outpatient visit with [facility] on 3/27/2025.  Description: Cervical spine pain      The following orders were placed and all results were independently analyzed by me:  Orders Placed This Encounter   Procedures    XR Chest 1 View    [place] Draw    High Sensitivity " Troponin T    Comprehensive Metabolic Panel    Lipase    BNP    Magnesium    CBC Auto Differential    High Sensitivity Troponin T 1Hr    NPO Diet NPO Type: Strict NPO    Undress & Gown    Continuous Pulse Oximetry    Oxygen Therapy- Nasal Cannula; Titrate 1-6 LPM Per SpO2; 90 - 95%    ECG 12 Lead ED Triage Standing Order; Chest Pain    ECG 12 Lead ED Triage Standing Order; Chest Pain    Insert Peripheral IV    CBC & Differential    Green Top (Gel)    Lavender Top    Gold Top - SST    Light Blue Top       Medications Given in the Emergency Department:  Medications   sodium chloride 0.9 % flush 10 mL (has no administration in time range)   aspirin chewable tablet 324 mg (324 mg Oral Given 4/18/25 2024)        ED Course:         Labs:    Lab Results (last 24 hours)       Procedure Component Value Units Date/Time    High Sensitivity Troponin T [[id number]]  (Normal) Collected: 04/18/25 1952    Specimen: Blood Updated: 04/18/25 2033     HS Troponin T 19 ng/L     Narrative:      High Sensitive Troponin T Reference Range:  <14.0 ng/L- Negative Female for AMI  <22.0 ng/L- Negative Male for AMI  >=14 - Abnormal Female indicating possible myocardial injury.  >=22 - Abnormal Male indicating possible myocardial injury.   Clinicians would have to utilize clinical acumen, EKG, Troponin, and serial changes to determine if it is an Acute Myocardial Infarction or myocardial injury due to an underlying chronic condition.         CBC & Differential [[id number]]  (Abnormal) Collected: 04/18/25 1952    Specimen: Blood Updated: 04/18/25 1957    Narrative:      The following orders were created for panel order CBC & Differential.  Procedure                               Abnormality         Status                     ---------                               -----------         ------                     CBC Auto Differential[[id number]]        Abnormal            Final result                 Please view results for these tests on the  individual orders.    Comprehensive Metabolic Panel [[id number]]  (Abnormal) Collected: 04/18/25 1952    Specimen: Blood Updated: 04/18/25 2033     Glucose 96 mg/dL      BUN 25 mg/dL      Creatinine 1.46 mg/dL      Sodium 135 mmol/L      Potassium 4.4 mmol/L      Chloride 101 mmol/L      CO2 23.6 mmol/L      Calcium 8.8 mg/dL      Total Protein 7.0 g/dL      Albumin 4.0 g/dL      ALT (SGPT) 14 U/L      AST (SGOT) 16 U/L      Alkaline Phosphatase 36 U/L      Total Bilirubin 0.2 mg/dL      Globulin 3.0 gm/dL      A/G Ratio 1.3 g/dL      BUN/Creatinine Ratio 17.1     Anion Gap 10.4 mmol/L      eGFR 51.1 mL/min/1.73     Narrative:      GFR Categories in Chronic Kidney Disease (CKD)      GFR Category          GFR (mL/min/1.73)    Interpretation  G1                     90 or greater         Normal or high (1)  G2                      60-89                Mild decrease (1)  G3a                   45-59                Mild to moderate decrease  G3b                   30-44                Moderate to severe decrease  G4                    15-29                Severe decrease  G5                    14 or less           Kidney failure          (1)In the absence of evidence of kidney disease, neither GFR category G1 or G2 fulfill the criteria for CKD.    eGFR calculation 2021 CKD-EPI creatinine equation, which does not include race as a factor    Lipase [[id number]]  (Abnormal) Collected: 04/18/25 1952    Specimen: Blood Updated: 04/18/25 2033     Lipase 75 U/L     BNP [[id number]]  (Normal) Collected: 04/18/25 1952    Specimen: Blood Updated: 04/18/25 2029     proBNP 52.5 pg/mL     Narrative:      This assay is used as an aid in the diagnosis of individuals suspected of having heart failure. It can be used as an aid in the diagnosis of acute decompensated heart failure (ADHF) in patients presenting with signs and symptoms of ADHF to the emergency department (ED). In addition, NT-proBNP of <300 pg/mL indicates ADHF is not  likely.    Age Range Result Interpretation  NT-proBNP Concentration (pg/mL:      <50             Positive            >450                   Gray                 300-450                    Negative             <300    50-75           Positive            >900                  Gray                300-900                  Negative            <300      >75             Positive            >1800                  Gray                300-1800                  Negative            <300    Magnesium [[id number]]  (Normal) Collected: 04/18/25 1952    Specimen: Blood Updated: 04/18/25 2033     Magnesium 1.7 mg/dL     CBC Auto Differential [[id number]]  (Abnormal) Collected: 04/18/25 1952    Specimen: Blood Updated: 04/18/25 1957     WBC 7.76 10*3/mm3      RBC 4.41 10*6/mm3      Hemoglobin 12.8 g/dL      Hematocrit 38.5 %      MCV 87.3 fL      MCH 29.0 pg      MCHC 33.2 g/dL      RDW 13.4 %      RDW-SD 42.5 fl      MPV 10.1 fL      Platelets 220 10*3/mm3      Neutrophil % 60.1 %      Lymphocyte % 25.9 %      Monocyte % 8.0 %      Eosinophil % 5.2 %      Basophil % 0.4 %      Immature Grans % 0.4 %      Neutrophils, Absolute 4.67 10*3/mm3      Lymphocytes, Absolute 2.01 10*3/mm3      Monocytes, Absolute 0.62 10*3/mm3      Eosinophils, Absolute 0.40 10*3/mm3      Basophils, Absolute 0.03 10*3/mm3      Immature Grans, Absolute 0.03 10*3/mm3      nRBC 0.0 /100 WBC     High Sensitivity Troponin T 1Hr [[id number]]  (Normal) Collected: 04/18/25 2138    Specimen: Blood Updated: 04/18/25 2220     HS Troponin T 19 ng/L      Troponin T Numeric Delta 0 ng/L     Narrative:      High Sensitive Troponin T Reference Range:  <14.0 ng/L- Negative Female for AMI  <22.0 ng/L- Negative Male for AMI  >=14 - Abnormal Female indicating possible myocardial injury.  >=22 - Abnormal Male indicating possible myocardial injury.   Clinicians would have to utilize clinical acumen, EKG, Troponin, and serial changes to determine if it is an Acute Myocardial  Infarction or myocardial injury due to an underlying chronic condition.                  Imaging:    XR Chest 1 View  Result Date: 4/18/2025  XR CHEST 1 VW Date of Exam: 4/18/2025 7:11 PM EDT Indication: Chest Pain Triage Protocol Comparison: 12/18/2023 at 1437 hours FINDINGS: No new consolidations or pleural effusions are observed. The cardiac silhouette and mediastinum are stable. No acute osseous abnormalities are identified.     There is no significant change when compared to the prior study. There is no evidence for acute cardiopulmonary process. Electronically Signed: Victor Hugo Heredia MD  4/18/2025 7:51 PM EDT  Workstation ID: [id number]        Differential Diagnosis and Discussion:    Chest Pain:  Based on the patient's signs and symptoms, I considered aortic dissection, myocardial infaction, pulmonary embolism, cardiac tamponade, pericarditis, pneumothorax, musculoskeletal chest pain and other differential diagnosis as an etiology of the patient's chest pain.     PROCEDURES:    Labs were collected in the emergency department and all labs were reviewed and interpreted by me.  X-ray were performed in the emergency department and all X-ray impressions were independently interpreted by me.  An EKG was performed and the EKG was interpreted by me.    ECG 12 Lead ED Triage Standing Order; Chest Pain   Preliminary Result   HEART RATE=79  bpm   RR Ffwfhczc=570  ms   MI Mhrfcpko=158  ms   P Horizontal Axis=-6  deg   P Front Axis=50  deg   QRSD Interval=81  ms   QT Odyiyatc=212  ms   SHmT=873  ms   QRS Axis=52  deg   T Wave Axis=56  deg   - OTHERWISE NORMAL ECG -   Sinus arrhythmia   Abnormal R-wave progression, early transition   Date and Time of Study:2025-04-18 19:02:45          Procedures    MDM                     Patient Care Considerations:    PERC: I used the PERC score to risk stratify the patient for PE and a CT of the chest was considered but ultimately not indicated in today's visit.      Consultants/Shared  Management Plan:    None    Social Determinants of Health:    Patient is independent, reliable, and has access to care.       Disposition and Care Coordination:    Discharged: I considered escalation of care by admitting this patient to the hospital, however patient declines admission.  He prefers to go home with outpatient follow-up.    I have explained the patient´s condition, diagnoses and treatment plan based on the information available to me at this time. I have answered questions and addressed any concerns. The patient has a good  understanding of the patient´s diagnosis, condition, and treatment plan as can be expected at this point. The vital signs have been stable. The patient´s condition is stable and appropriate for discharge from the emergency department.      The patient will pursue further outpatient evaluation with the primary care physician or other designated or consulting physician as outlined in the discharge instructions. They are agreeable to this plan of care and follow-up instructions have been explained in detail. The patient has received these instructions in written format and has expressed an understanding of the discharge instructions. The patient is aware that any significant change in condition or worsening of symptoms should prompt an immediate return to this or the closest emergency department or call to 911.    Final diagnoses:   Nonspecific chest pain        ED Disposition       ED Disposition   Discharge    Condition   Stable    Comment   --               This medical record created using voice recognition software.             Bib Monroe MD  04/18/25 [id number]

## 2025-04-19 NOTE — DISCHARGE INSTRUCTIONS
Return to the emergency department immediately for worsening symptoms.  I did place an ambulatory referral to cardiology so you should receive a call in the next 2-3 business days to schedule an appointment.  
declines

## 2025-04-29 LAB
QT INTERVAL: 345 MS
QTC INTERVAL: 396 MS

## 2025-07-10 ENCOUNTER — OFFICE VISIT (OUTPATIENT)
Dept: CARDIOLOGY | Facility: CLINIC | Age: 70
End: 2025-07-10
Payer: MEDICARE

## 2025-07-10 VITALS
SYSTOLIC BLOOD PRESSURE: 113 MMHG | DIASTOLIC BLOOD PRESSURE: 63 MMHG | BODY MASS INDEX: 29.06 KG/M2 | HEIGHT: 70 IN | WEIGHT: 203 LBS | HEART RATE: 69 BPM

## 2025-07-10 DIAGNOSIS — R07.9 CHEST PAIN, UNSPECIFIED TYPE: Primary | ICD-10-CM

## 2025-07-10 DIAGNOSIS — I10 ESSENTIAL HYPERTENSION: ICD-10-CM

## 2025-07-10 PROCEDURE — 3074F SYST BP LT 130 MM HG: CPT | Performed by: INTERNAL MEDICINE

## 2025-07-10 PROCEDURE — 99204 OFFICE O/P NEW MOD 45 MIN: CPT | Performed by: INTERNAL MEDICINE

## 2025-07-10 PROCEDURE — 1159F MED LIST DOCD IN RCRD: CPT | Performed by: INTERNAL MEDICINE

## 2025-07-10 PROCEDURE — 1160F RVW MEDS BY RX/DR IN RCRD: CPT | Performed by: INTERNAL MEDICINE

## 2025-07-10 PROCEDURE — 3078F DIAST BP <80 MM HG: CPT | Performed by: INTERNAL MEDICINE

## 2025-07-10 NOTE — LETTER
July 11, 2025     Maria Del Carmen Brownlee PA-C  [address] [place] [address]    Patient: Darinel Garza   YOB: 1955   Date of Visit: 7/10/2025       Dear Maria Del Carmen Brownlee PA-C    Darinel Garza was in my office today. Below is a copy of my note.    If you have questions, please do not hesitate to call me. I look forward to following Darinel along with you.         Sincerely,        Nam Samano MD        CC: No Recipients      CARDIOLOGY INITIAL CONSULT       Chief Complaint  Establish Care and Chest Pain    Subjective           Darinel Garza presents to [facility] CARDIOLOGY  History of Present Illness      This is a 69-year-old male with hypertension, hyperlipidemia, diabetes mellitus, chronic neck pain.  He was referred for cardiac evaluation because of chest pain.  He is having episodic right-sided chest discomfort for the past several weeks.  They are described as tightness without any sharp component, mostly at rest.  There is no associated shortness of breath, palpitations, dizziness or diaphoresis.  He was seen in the emergency room in April of this year because of chest pain.  At that time, he had pain shooting down both arms.    He also has significant neck problems with numbness involving both arms.  He had neck surgeries for cervical spine problems in the past and has another one scheduled next week.    His previous cardiac evaluation was in 2015, again for right-sided chest pains.  He had a stress test which was negative.    Past History:    Medical History:  Past Medical History:   Diagnosis Date   • Diabetes mellitus    • Hypertension    • Urinary retention        Family History: Reviewed, no family history of premature coronary artery disease    Social History: reports that he has never smoked. He has never used smokeless tobacco. He reports current alcohol use.    Allergies: Penicillins    Current Outpatient Medications on File Prior to Visit   Medication  "Sig   • fenofibrate 160 MG tablet Take 1 tablet by mouth Daily.   • hydroCHLOROthiazide (HYDRODIURIL) 25 MG tablet Take 1 tablet by mouth Daily.   • lisinopril (PRINIVIL,ZESTRIL) 20 MG tablet Take 1 tablet by mouth Daily.   • Magnesium Oxide -Mg Supplement 400 (240 Mg) MG tablet Take 1 tablet by mouth Daily.   • metFORMIN (GLUCOPHAGE) 500 MG tablet Take 2 tablets by mouth 2 (Two) Times a Day With Meals.   • OneTouch Delica Lancets 30G misc Inject 1 each under the skin into the appropriate area as directed 4 (Four) Times a Day.   • OneTouch Verio test strip CHECK BLOOD GLUCOSE FOUR TIMES DAILY   • pravastatin (PRAVACHOL) 80 MG tablet Take 1 tablet by mouth Daily.   • tamsulosin (FLOMAX) 0.4 MG capsule 24 hr capsule 1 capsule.   • Tradjenta 5 MG tablet tablet Take 1 tablet by mouth Daily.   • vitamin D (ERGOCALCIFEROL) 1.25 MG (73835 UT) capsule capsule Take 1 capsule by mouth 1 (One) Time Per Week.     No current facility-administered medications on file prior to visit.          Review of Systems   Constitutional:  Negative for fatigue, unexpected weight gain and unexpected weight loss.   Eyes:  Negative for double vision.   Respiratory:  Negative for cough, shortness of breath and wheezing.    Cardiovascular:  Positive for chest pain. Negative for palpitations and leg swelling.   Gastrointestinal:  Negative for abdominal pain, nausea and vomiting.   Endocrine: Negative for cold intolerance, heat intolerance, polydipsia and polyuria.   Musculoskeletal:  Positive for neck pain and neck stiffness. Negative for arthralgias and back pain.   Skin:  Negative for color change.   Neurological:  Positive for numbness. Negative for dizziness, syncope, weakness and headache.   Hematological:  Does not bruise/bleed easily.        Objective    /63   Pulse 69   Ht 177.8 cm (70\")   Wt 92.1 kg (203 lb)   BMI 29.13 kg/m²       Physical Exam  Constitutional:       General: He is awake. He is not in acute distress.     " Appearance: Normal appearance.   Eyes:      Extraocular Movements: Extraocular movements intact.      Pupils: Pupils are equal, round, and reactive to light.   Neck:      Thyroid: No thyromegaly.      Vascular: No carotid bruit or JVD.   Cardiovascular:      Rate and Rhythm: Normal rate and regular rhythm.      Chest Wall: PMI is not displaced.      Heart sounds: Normal heart sounds, S1 normal and S2 normal. No murmur heard.     No friction rub. No gallop. No S3 or S4 sounds.   Pulmonary:      Effort: Pulmonary effort is normal. No respiratory distress.      Breath sounds: Normal breath sounds. No wheezing, rhonchi or rales.   Abdominal:      General: Bowel sounds are normal.      Palpations: Abdomen is soft.      Tenderness: There is no abdominal tenderness.   Musculoskeletal:      Cervical back: Neck supple.      Right lower leg: No edema.      Left lower leg: No edema.   Skin:     Nails: There is no clubbing.   Neurological:      General: No focal deficit present.      Mental Status: He is alert and oriented to person, place, and time.           Result Review:     The following data was reviewed by: Nam Samano MD on 07/10/2025:    CMP          4/18/2025    19:52   CMP   Glucose 96    BUN 25    Creatinine 1.46    EGFR 51.1    Sodium 135    Potassium 4.4    Chloride 101    Calcium 8.8    Total Protein 7.0    Albumin 4.0    Globulin 3.0    Total Bilirubin 0.2    Alkaline Phosphatase 36    AST (SGOT) 16    ALT (SGPT) 14    Albumin/Globulin Ratio 1.3    BUN/Creatinine Ratio 17.1    Anion Gap 10.4      CBC          4/18/2025    19:52   CBC   WBC 7.76    RBC 4.41    Hemoglobin 12.8    Hematocrit 38.5    MCV 87.3    MCH 29.0    MCHC 33.2    RDW 13.4    Platelets 220             Data reviewed: Cardiology studies      EKG done in April 2025 during ER visit showed sinus rhythm, premature atrial complexes, no significant ischemic changes.               Assessment and Plan        Diagnoses and all orders for this  visit:    1. Chest pain, unspecified type (Primary)  Assessment & Plan:  Intermittent episodes of right-sided chest pain, mostly at rest.  Symptoms are somewhat atypical for angina.  However, he has multiple risk factors for coronary artery disease including diabetes mellitus, hypertension and hyperlipidemia.  EKG is unremarkable.  No recent ischemic evaluation available.  We will proceed with a SPECT stress test to rule out reversible ischemia.  Echocardiography done to evaluate LV function and valvular function.    Orders:  -     Adult Transthoracic Echo Complete W/ Cont if Necessary Per Protocol; Future  -     Stress Test With Myocardial Perfusion One Day; Future    2. Essential hypertension  Assessment & Plan:  Blood pressure very well-controlled.  Continue lisinopril and hydrochlorothiazide with no changes.              I spent 19 minutes caring for Darinel on this date of service. This time includes time spent by me in the following activities:reviewing tests, obtaining and/or reviewing a separately obtained history, performing a medically appropriate examination and/or evaluation , ordering medications, tests, or procedures, and documenting information in the medical record    Follow Up     Return for Will schedule f/u after reviewing test results.    Patient was given instructions and counseling regarding his condition or for health maintenance advice. Please see specific information pulled into the AVS if appropriate.

## 2025-07-11 ENCOUNTER — HOSPITAL ENCOUNTER (OUTPATIENT)
Facility: HOSPITAL | Age: 70
Discharge: HOME | End: 2025-07-11
Payer: MEDICARE

## 2025-07-11 DIAGNOSIS — R07.9 CHEST PAIN, UNSPECIFIED TYPE: ICD-10-CM

## 2025-07-11 LAB
BH CV IMMEDIATE POST RECOVERY TECH DATA SYMPTOMS: NORMAL
BH CV IMMEDIATE POST TECH DATA BLOOD PRESSURE: NORMAL MMHG
BH CV IMMEDIATE POST TECH DATA HEART RATE: 90 BPM
BH CV IMMEDIATE POST TECH DATA OXYGEN SATS: 98 %
BH CV REST NUCLEAR ISOTOPE DOSE: 9.6 MCI
BH CV SIX MINUTE RECOVERY TECH DATA BLOOD PRESSURE: NORMAL
BH CV SIX MINUTE RECOVERY TECH DATA HEART RATE: 87 BPM
BH CV SIX MINUTE RECOVERY TECH DATA OXYGEN SATURATION: 98 %
BH CV SIX MINUTE RECOVERY TECH DATA SYMPTOMS: NORMAL
BH CV STRESS BP STAGE 1: NORMAL
BH CV STRESS BP STAGE 2: NORMAL
BH CV STRESS DURATION MIN STAGE 1: 3
BH CV STRESS DURATION MIN STAGE 2: 3
BH CV STRESS DURATION SEC STAGE 1: 0
BH CV STRESS DURATION SEC STAGE 2: 0
BH CV STRESS GRADE STAGE 1: 10
BH CV STRESS GRADE STAGE 2: 12
BH CV STRESS HR STAGE 1: 106
BH CV STRESS HR STAGE 2: 136
BH CV STRESS METS STAGE 1: 5
BH CV STRESS METS STAGE 2: 7.5
BH CV STRESS NUCLEAR ISOTOPE DOSE: 35.7 MCI
BH CV STRESS O2 STAGE 1: 97
BH CV STRESS O2 STAGE 2: 96
BH CV STRESS PROTOCOL 1: NORMAL
BH CV STRESS RECOVERY BP: NORMAL MMHG
BH CV STRESS RECOVERY HR: 87 BPM
BH CV STRESS RECOVERY O2: 98 %
BH CV STRESS SPEED STAGE 1: 1.7
BH CV STRESS SPEED STAGE 2: 2.5
BH CV STRESS STAGE 1: 1
BH CV STRESS STAGE 2: 2
BH CV THREE MINUTE POST TECH DATA BLOOD PRESSURE: NORMAL MMHG
BH CV THREE MINUTE POST TECH DATA HEART RATE: 89 BPM
BH CV THREE MINUTE POST TECH DATA OXYGEN SATURATION: 98 %
MAXIMAL PREDICTED HEART RATE: 149 BPM
PERCENT MAX PREDICTED HR: 91.28 %
SPECT HRT GATED+EF W RNC IV: 52 %
STRESS BASELINE BP: NORMAL MMHG
STRESS BASELINE HR: 60 BPM
STRESS O2 SAT REST: 97 %
STRESS PERCENT HR: 107 %
STRESS POST ESTIMATED WORKLOAD: 7.1 METS
STRESS POST EXERCISE DUR MIN: 5 MIN
STRESS POST EXERCISE DUR SEC: 59 SEC
STRESS POST O2 SAT PEAK: 96 %
STRESS POST PEAK BP: NORMAL MMHG
STRESS POST PEAK HR: 136 BPM
STRESS TARGET HR: 127 BPM

## 2025-07-11 PROCEDURE — 78452 HT MUSCLE IMAGE SPECT MULT: CPT

## 2025-07-11 PROCEDURE — A9502 TC99M TETROFOSMIN: HCPCS | Performed by: INTERNAL MEDICINE

## 2025-07-11 PROCEDURE — 34310000005 TECHNETIUM TETROFOSMIN KIT: Performed by: INTERNAL MEDICINE

## 2025-07-11 PROCEDURE — 93017 CV STRESS TEST TRACING ONLY: CPT

## 2025-07-11 RX ADMIN — TETROFOSMIN 1 DOSE: 1.38 INJECTION, POWDER, LYOPHILIZED, FOR SOLUTION INTRAVENOUS at 07:58

## 2025-07-11 RX ADMIN — TETROFOSMIN 1 DOSE: 1.38 INJECTION, POWDER, LYOPHILIZED, FOR SOLUTION INTRAVENOUS at 09:40

## 2025-07-11 NOTE — ASSESSMENT & PLAN NOTE
Blood pressure very well-controlled.  Continue lisinopril and hydrochlorothiazide with no changes.

## 2025-07-11 NOTE — ASSESSMENT & PLAN NOTE
Intermittent episodes of right-sided chest pain, mostly at rest.  Symptoms are somewhat atypical for angina.  However, he has multiple risk factors for coronary artery disease including diabetes mellitus, hypertension and hyperlipidemia.  EKG is unremarkable.  No recent ischemic evaluation available.  We will proceed with a SPECT stress test to rule out reversible ischemia.  Echocardiography done to evaluate LV function and valvular function.

## 2025-07-11 NOTE — PROGRESS NOTES
CARDIOLOGY INITIAL CONSULT       Chief Complaint  Establish Care and Chest Pain    Subjective            Darinel Garza presents to [facility] CARDIOLOGY  History of Present Illness      This is a 69-year-old male with hypertension, hyperlipidemia, diabetes mellitus, chronic neck pain.  He was referred for cardiac evaluation because of chest pain.  He is having episodic right-sided chest discomfort for the past several weeks.  They are described as tightness without any sharp component, mostly at rest.  There is no associated shortness of breath, palpitations, dizziness or diaphoresis.  He was seen in the emergency room in April of this year because of chest pain.  At that time, he had pain shooting down both arms.    He also has significant neck problems with numbness involving both arms.  He had neck surgeries for cervical spine problems in the past and has another one scheduled next week.    His previous cardiac evaluation was in 2015, again for right-sided chest pains.  He had a stress test which was negative.    Past History:    Medical History:  Past Medical History:   Diagnosis Date    Diabetes mellitus     Hypertension     Urinary retention        Family History: Reviewed, no family history of premature coronary artery disease    Social History: reports that he has never smoked. He has never used smokeless tobacco. He reports current alcohol use.    Allergies: Penicillins    Current Outpatient Medications on File Prior to Visit   Medication Sig    fenofibrate 160 MG tablet Take 1 tablet by mouth Daily.    hydroCHLOROthiazide (HYDRODIURIL) 25 MG tablet Take 1 tablet by mouth Daily.    lisinopril (PRINIVIL,ZESTRIL) 20 MG tablet Take 1 tablet by mouth Daily.    Magnesium Oxide -Mg Supplement 400 (240 Mg) MG tablet Take 1 tablet by mouth Daily.    metFORMIN (GLUCOPHAGE) 500 MG tablet Take 2 tablets by mouth 2 (Two) Times a Day With Meals.    OneTouch Delica Lancets 30G misc Inject 1 each  "under the skin into the appropriate area as directed 4 (Four) Times a Day.    OneTouch Verio test strip CHECK BLOOD GLUCOSE FOUR TIMES DAILY    pravastatin (PRAVACHOL) 80 MG tablet Take 1 tablet by mouth Daily.    tamsulosin (FLOMAX) 0.4 MG capsule 24 hr capsule 1 capsule.    Tradjenta 5 MG tablet tablet Take 1 tablet by mouth Daily.    vitamin D (ERGOCALCIFEROL) 1.25 MG (24229 UT) capsule capsule Take 1 capsule by mouth 1 (One) Time Per Week.     No current facility-administered medications on file prior to visit.          Review of Systems   Constitutional:  Negative for fatigue, unexpected weight gain and unexpected weight loss.   Eyes:  Negative for double vision.   Respiratory:  Negative for cough, shortness of breath and wheezing.    Cardiovascular:  Positive for chest pain. Negative for palpitations and leg swelling.   Gastrointestinal:  Negative for abdominal pain, nausea and vomiting.   Endocrine: Negative for cold intolerance, heat intolerance, polydipsia and polyuria.   Musculoskeletal:  Positive for neck pain and neck stiffness. Negative for arthralgias and back pain.   Skin:  Negative for color change.   Neurological:  Positive for numbness. Negative for dizziness, syncope, weakness and headache.   Hematological:  Does not bruise/bleed easily.        Objective     /63   Pulse 69   Ht 177.8 cm (70\")   Wt 92.1 kg (203 lb)   BMI 29.13 kg/m²       Physical Exam  Constitutional:       General: He is awake. He is not in acute distress.     Appearance: Normal appearance.   Eyes:      Extraocular Movements: Extraocular movements intact.      Pupils: Pupils are equal, round, and reactive to light.   Neck:      Thyroid: No thyromegaly.      Vascular: No carotid bruit or JVD.   Cardiovascular:      Rate and Rhythm: Normal rate and regular rhythm.      Chest Wall: PMI is not displaced.      Heart sounds: Normal heart sounds, S1 normal and S2 normal. No murmur heard.     No friction rub. No gallop. No S3 " or S4 sounds.   Pulmonary:      Effort: Pulmonary effort is normal. No respiratory distress.      Breath sounds: Normal breath sounds. No wheezing, rhonchi or rales.   Abdominal:      General: Bowel sounds are normal.      Palpations: Abdomen is soft.      Tenderness: There is no abdominal tenderness.   Musculoskeletal:      Cervical back: Neck supple.      Right lower leg: No edema.      Left lower leg: No edema.   Skin:     Nails: There is no clubbing.   Neurological:      General: No focal deficit present.      Mental Status: He is alert and oriented to person, place, and time.           Result Review :     The following data was reviewed by: Nam Samano MD on 07/10/2025:    CMP          4/18/2025    19:52   CMP   Glucose 96    BUN 25    Creatinine 1.46    EGFR 51.1    Sodium 135    Potassium 4.4    Chloride 101    Calcium 8.8    Total Protein 7.0    Albumin 4.0    Globulin 3.0    Total Bilirubin 0.2    Alkaline Phosphatase 36    AST (SGOT) 16    ALT (SGPT) 14    Albumin/Globulin Ratio 1.3    BUN/Creatinine Ratio 17.1    Anion Gap 10.4      CBC          4/18/2025    19:52   CBC   WBC 7.76    RBC 4.41    Hemoglobin 12.8    Hematocrit 38.5    MCV 87.3    MCH 29.0    MCHC 33.2    RDW 13.4    Platelets 220             Data reviewed: Cardiology studies      EKG done in April 2025 during ER visit showed sinus rhythm, premature atrial complexes, no significant ischemic changes.               Assessment and Plan        Diagnoses and all orders for this visit:    1. Chest pain, unspecified type (Primary)  Assessment & Plan:  Intermittent episodes of right-sided chest pain, mostly at rest.  Symptoms are somewhat atypical for angina.  However, he has multiple risk factors for coronary artery disease including diabetes mellitus, hypertension and hyperlipidemia.  EKG is unremarkable.  No recent ischemic evaluation available.  We will proceed with a SPECT stress test to rule out reversible ischemia.  Echocardiography  done to evaluate LV function and valvular function.    Orders:  -     Adult Transthoracic Echo Complete W/ Cont if Necessary Per Protocol; Future  -     Stress Test With Myocardial Perfusion One Day; Future    2. Essential hypertension  Assessment & Plan:  Blood pressure very well-controlled.  Continue lisinopril and hydrochlorothiazide with no changes.              I spent 19 minutes caring for Darinel on this date of service. This time includes time spent by me in the following activities:reviewing tests, obtaining and/or reviewing a separately obtained history, performing a medically appropriate examination and/or evaluation , ordering medications, tests, or procedures, and documenting information in the medical record    Follow Up     Return for Will schedule f/u after reviewing test results.    Patient was given instructions and counseling regarding his condition or for health maintenance advice. Please see specific information pulled into the AVS if appropriate.

## 2025-07-16 ENCOUNTER — HOSPITAL ENCOUNTER (OUTPATIENT)
Facility: HOSPITAL | Age: 70
Discharge: HOME | End: 2025-07-16
Admitting: INTERNAL MEDICINE
Payer: MEDICARE

## 2025-07-16 DIAGNOSIS — R07.9 CHEST PAIN, UNSPECIFIED TYPE: ICD-10-CM

## 2025-07-16 PROCEDURE — 93306 TTE W/DOPPLER COMPLETE: CPT

## 2025-07-21 ENCOUNTER — HOSPITAL ENCOUNTER (EMERGENCY)
Facility: HOSPITAL | Age: 70
Discharge: ANOTHER HOSPITAL | End: 2025-07-21
Attending: EMERGENCY MEDICINE | Admitting: EMERGENCY MEDICINE
Payer: MEDICARE

## 2025-07-21 ENCOUNTER — APPOINTMENT (OUTPATIENT)
Dept: CT IMAGING | Facility: HOSPITAL | Age: 70
End: 2025-07-21
Payer: MEDICARE

## 2025-07-21 VITALS
TEMPERATURE: 99 F | HEIGHT: 70 IN | OXYGEN SATURATION: 95 % | HEART RATE: 83 BPM | BODY MASS INDEX: 27.96 KG/M2 | RESPIRATION RATE: 17 BRPM | SYSTOLIC BLOOD PRESSURE: 145 MMHG | WEIGHT: 195.33 LBS | DIASTOLIC BLOOD PRESSURE: 89 MMHG

## 2025-07-21 DIAGNOSIS — R52 INTRACTABLE PAIN: ICD-10-CM

## 2025-07-21 DIAGNOSIS — G89.18 POSTOPERATIVE PAIN AFTER SPINAL SURGERY: Primary | ICD-10-CM

## 2025-07-21 LAB
ALBUMIN SERPL-MCNC: 4 G/DL (ref 3.5–5.2)
ALBUMIN/GLOB SERPL: 1.2 G/DL
ALP SERPL-CCNC: 36 U/L (ref 39–117)
ALT SERPL W P-5'-P-CCNC: 10 U/L (ref 1–41)
ANION GAP SERPL CALCULATED.3IONS-SCNC: 11.8 MMOL/L (ref 5–15)
AORTIC DIMENSIONLESS INDEX: 0.87 (DI)
AST SERPL-CCNC: 16 U/L (ref 1–40)
AV MEAN PRESS GRAD SYS DOP V1V2: 3.7 MMHG
AV VMAX SYS DOP: 130.3 CM/SEC
BASOPHILS # BLD AUTO: 0.03 10*3/MM3 (ref 0–0.2)
BASOPHILS NFR BLD AUTO: 0.3 % (ref 0–1.5)
BH CV ECHO MEAS - AO MAX PG: 6.8 MMHG
BH CV ECHO MEAS - AO ROOT DIAM: 3.2 CM
BH CV ECHO MEAS - AO V2 VTI: 27 CM
BH CV ECHO MEAS - AVA(I,D): 3.5 CM2
BH CV ECHO MEAS - EDV(CUBED): 87 ML
BH CV ECHO MEAS - EDV(MOD-SP2): 103 ML
BH CV ECHO MEAS - EDV(MOD-SP4): 91.4 ML
BH CV ECHO MEAS - EF(MOD-SP2): 68.9 %
BH CV ECHO MEAS - EF(MOD-SP4): 66.4 %
BH CV ECHO MEAS - ESV(CUBED): 18.2 ML
BH CV ECHO MEAS - ESV(MOD-SP2): 32 ML
BH CV ECHO MEAS - ESV(MOD-SP4): 30.7 ML
BH CV ECHO MEAS - FS: 40.6 %
BH CV ECHO MEAS - IVS/LVPW: 1.1 CM
BH CV ECHO MEAS - IVSD: 0.83 CM
BH CV ECHO MEAS - LA DIMENSION: 4.2 CM
BH CV ECHO MEAS - LAT PEAK E' VEL: 11.4 CM/SEC
BH CV ECHO MEAS - LV DIASTOLIC VOL/BSA (35-75): 44.8 CM2
BH CV ECHO MEAS - LV MASS(C)D: 109.6 GRAMS
BH CV ECHO MEAS - LV MAX PG: 3.2 MMHG
BH CV ECHO MEAS - LV MEAN PG: 1.77 MMHG
BH CV ECHO MEAS - LV SYSTOLIC VOL/BSA (12-30): 15 CM2
BH CV ECHO MEAS - LV V1 MAX: 90 CM/SEC
BH CV ECHO MEAS - LV V1 VTI: 23.4 CM
BH CV ECHO MEAS - LVIDD: 4.4 CM
BH CV ECHO MEAS - LVIDS: 2.6 CM
BH CV ECHO MEAS - LVOT AREA: 4.1 CM2
BH CV ECHO MEAS - LVOT DIAM: 2.27 CM
BH CV ECHO MEAS - LVPWD: 0.76 CM
BH CV ECHO MEAS - MED PEAK E' VEL: 7.6 CM/SEC
BH CV ECHO MEAS - MV A MAX VEL: 66.7 CM/SEC
BH CV ECHO MEAS - MV DEC SLOPE: 363.9 CM/SEC2
BH CV ECHO MEAS - MV DEC TIME: 0.25 SEC
BH CV ECHO MEAS - MV E MAX VEL: 89.4 CM/SEC
BH CV ECHO MEAS - MV E/A: 1.34
BH CV ECHO MEAS - RAP SYSTOLE: 3 MMHG
BH CV ECHO MEAS - RVSP: 29 MMHG
BH CV ECHO MEAS - SV(LVOT): 94.9 ML
BH CV ECHO MEAS - SV(MOD-SP2): 71 ML
BH CV ECHO MEAS - SV(MOD-SP4): 60.7 ML
BH CV ECHO MEAS - SVI(LVOT): 46.5 ML/M2
BH CV ECHO MEAS - SVI(MOD-SP2): 34.8 ML/M2
BH CV ECHO MEAS - SVI(MOD-SP4): 29.7 ML/M2
BH CV ECHO MEAS - TAPSE (>1.6): 2.21 CM
BH CV ECHO MEAS - TR MAX PG: 25.8 MMHG
BH CV ECHO MEAS - TR MAX VEL: 253.8 CM/SEC
BH CV ECHO MEASUREMENTS AVERAGE E/E' RATIO: 9.41
BH CV XLRA - RV BASE: 3 CM
BH CV XLRA - TDI S': 9.2 CM/SEC
BILIRUB SERPL-MCNC: 0.7 MG/DL (ref 0–1.2)
BUN SERPL-MCNC: 16.1 MG/DL (ref 8–23)
BUN/CREAT SERPL: 16.8 (ref 7–25)
CALCIUM SPEC-SCNC: 9.8 MG/DL (ref 8.6–10.5)
CHLORIDE SERPL-SCNC: 92 MMOL/L (ref 98–107)
CO2 SERPL-SCNC: 27.2 MMOL/L (ref 22–29)
CREAT SERPL-MCNC: 0.96 MG/DL (ref 0.76–1.27)
DEPRECATED RDW RBC AUTO: 38.4 FL (ref 37–54)
EGFRCR SERPLBLD CKD-EPI 2021: 84.5 ML/MIN/1.73
EOSINOPHIL # BLD AUTO: 0.08 10*3/MM3 (ref 0–0.4)
EOSINOPHIL NFR BLD AUTO: 0.7 % (ref 0.3–6.2)
ERYTHROCYTE [DISTWIDTH] IN BLOOD BY AUTOMATED COUNT: 12.3 % (ref 12.3–15.4)
GEN 5 1HR TROPONIN T REFLEX: 16 NG/L
GLOBULIN UR ELPH-MCNC: 3.4 GM/DL
GLUCOSE SERPL-MCNC: 176 MG/DL (ref 65–99)
HCT VFR BLD AUTO: 36.7 % (ref 37.5–51)
HGB BLD-MCNC: 12.5 G/DL (ref 13–17.7)
HOLD SPECIMEN: NORMAL
HOLD SPECIMEN: NORMAL
IMM GRANULOCYTES # BLD AUTO: 0.08 10*3/MM3 (ref 0–0.05)
IMM GRANULOCYTES NFR BLD AUTO: 0.7 % (ref 0–0.5)
LEFT ATRIUM VOLUME INDEX: 23.2 ML/M2
LYMPHOCYTES # BLD AUTO: 1.28 10*3/MM3 (ref 0.7–3.1)
LYMPHOCYTES NFR BLD AUTO: 10.7 % (ref 19.6–45.3)
MCH RBC QN AUTO: 29 PG (ref 26.6–33)
MCHC RBC AUTO-ENTMCNC: 34.1 G/DL (ref 31.5–35.7)
MCV RBC AUTO: 85.2 FL (ref 79–97)
MONOCYTES # BLD AUTO: 1.23 10*3/MM3 (ref 0.1–0.9)
MONOCYTES NFR BLD AUTO: 10.3 % (ref 5–12)
NEUTROPHILS NFR BLD AUTO: 77.3 % (ref 42.7–76)
NEUTROPHILS NFR BLD AUTO: 9.28 10*3/MM3 (ref 1.7–7)
NRBC BLD AUTO-RTO: 0 /100 WBC (ref 0–0.2)
PLATELET # BLD AUTO: 208 10*3/MM3 (ref 140–450)
PMV BLD AUTO: 9.9 FL (ref 6–12)
POTASSIUM SERPL-SCNC: 3.9 MMOL/L (ref 3.5–5.2)
PROT SERPL-MCNC: 7.4 G/DL (ref 6–8.5)
QT INTERVAL: 341 MS
QTC INTERVAL: 404 MS
RBC # BLD AUTO: 4.31 10*6/MM3 (ref 4.14–5.8)
SINUS: 2.8 CM
SODIUM SERPL-SCNC: 131 MMOL/L (ref 136–145)
TROPONIN T NUMERIC DELTA: -1 NG/L
TROPONIN T SERPL HS-MCNC: 17 NG/L
WBC NRBC COR # BLD AUTO: 11.98 10*3/MM3 (ref 3.4–10.8)
WHOLE BLOOD HOLD COAG: NORMAL
WHOLE BLOOD HOLD SPECIMEN: NORMAL

## 2025-07-21 PROCEDURE — 72125 CT NECK SPINE W/O DYE: CPT

## 2025-07-21 PROCEDURE — 96375 TX/PRO/DX INJ NEW DRUG ADDON: CPT

## 2025-07-21 PROCEDURE — 25010000002 ONDANSETRON PER 1 MG: Performed by: EMERGENCY MEDICINE

## 2025-07-21 PROCEDURE — 25010000002 HYDROMORPHONE 1 MG/ML SOLUTION: Performed by: EMERGENCY MEDICINE

## 2025-07-21 PROCEDURE — 71275 CT ANGIOGRAPHY CHEST: CPT

## 2025-07-21 PROCEDURE — 36415 COLL VENOUS BLD VENIPUNCTURE: CPT

## 2025-07-21 PROCEDURE — 96374 THER/PROPH/DIAG INJ IV PUSH: CPT

## 2025-07-21 PROCEDURE — 96376 TX/PRO/DX INJ SAME DRUG ADON: CPT

## 2025-07-21 PROCEDURE — 25010000002 KETOROLAC TROMETHAMINE PER 15 MG: Performed by: EMERGENCY MEDICINE

## 2025-07-21 PROCEDURE — 25510000001 IOPAMIDOL PER 1 ML: Performed by: EMERGENCY MEDICINE

## 2025-07-21 PROCEDURE — 85025 COMPLETE CBC W/AUTO DIFF WBC: CPT

## 2025-07-21 PROCEDURE — 99285 EMERGENCY DEPT VISIT HI MDM: CPT

## 2025-07-21 PROCEDURE — 80053 COMPREHEN METABOLIC PANEL: CPT | Performed by: EMERGENCY MEDICINE

## 2025-07-21 PROCEDURE — 93005 ELECTROCARDIOGRAM TRACING: CPT | Performed by: EMERGENCY MEDICINE

## 2025-07-21 PROCEDURE — 84484 ASSAY OF TROPONIN QUANT: CPT | Performed by: EMERGENCY MEDICINE

## 2025-07-21 RX ORDER — ONDANSETRON 2 MG/ML
4 INJECTION INTRAMUSCULAR; INTRAVENOUS ONCE
Status: COMPLETED | OUTPATIENT
Start: 2025-07-21 | End: 2025-07-21

## 2025-07-21 RX ORDER — IOPAMIDOL 755 MG/ML
100 INJECTION, SOLUTION INTRAVASCULAR
Status: COMPLETED | OUTPATIENT
Start: 2025-07-21 | End: 2025-07-21

## 2025-07-21 RX ORDER — DIAZEPAM 10 MG/2ML
2.5 INJECTION, SOLUTION INTRAMUSCULAR; INTRAVENOUS ONCE
Status: DISCONTINUED | OUTPATIENT
Start: 2025-07-21 | End: 2025-07-21

## 2025-07-21 RX ORDER — ACETAMINOPHEN 10 MG/ML
1000 INJECTION, SOLUTION INTRAVENOUS ONCE
Status: DISCONTINUED | OUTPATIENT
Start: 2025-07-21 | End: 2025-07-21

## 2025-07-21 RX ORDER — HYDROCODONE BITARTRATE AND ACETAMINOPHEN 5; 325 MG/1; MG/1
2 TABLET ORAL ONCE
Refills: 0 | Status: COMPLETED | OUTPATIENT
Start: 2025-07-21 | End: 2025-07-21

## 2025-07-21 RX ORDER — KETOROLAC TROMETHAMINE 15 MG/ML
15 INJECTION, SOLUTION INTRAMUSCULAR; INTRAVENOUS ONCE
Status: COMPLETED | OUTPATIENT
Start: 2025-07-21 | End: 2025-07-21

## 2025-07-21 RX ADMIN — KETOROLAC TROMETHAMINE 15 MG: 15 INJECTION, SOLUTION INTRAMUSCULAR; INTRAVENOUS at 16:54

## 2025-07-21 RX ADMIN — HYDROMORPHONE HYDROCHLORIDE 0.5 MG: 1 INJECTION, SOLUTION INTRAMUSCULAR; INTRAVENOUS; SUBCUTANEOUS at 16:07

## 2025-07-21 RX ADMIN — HYDROCODONE BITARTRATE AND ACETAMINOPHEN 2 TABLET: 5; 325 TABLET ORAL at 10:18

## 2025-07-21 RX ADMIN — HYDROMORPHONE HYDROCHLORIDE 1 MG: 1 INJECTION, SOLUTION INTRAMUSCULAR; INTRAVENOUS; SUBCUTANEOUS at 05:45

## 2025-07-21 RX ADMIN — IOPAMIDOL 100 ML: 755 INJECTION, SOLUTION INTRAVENOUS at 07:34

## 2025-07-21 RX ADMIN — ONDANSETRON 4 MG: 2 INJECTION, SOLUTION INTRAMUSCULAR; INTRAVENOUS at 16:06

## 2025-07-21 NOTE — ED PROVIDER NOTES
Time: 7:15 AM EDT  Date of encounter:  7/21/2025  Independent Historian/Clinical History and Information was obtained by:   Patient    History is limited by: N/A    Chief Complaint: post surgical pain      History of Present Illness:  Patient is a 69 y.o. year old male who presents to the emergency department for evaluation of Postsurgical neck pain following cervical spine fusion surgery at [facility] a few days ago, now radiating to chest and right arm.  Patient had a posterior cervical fusion done at [facility].  He was discharged yesterday.  Patient came in due to uncontrolled pain.  Patient states he could not stand due to severe pain.  He denies any focal deficits.  Patient received 100 mcg of fentanyl en route.    Family states he has been alternating Valium 5 mg every 3 hours with Norco 10 mg but has not been able to get control of his severe neck pain.  No neurologic deficits noted.    Patient Care Team  Primary Care Provider: Maria Del Carmen Brownlee PA-C    Past Medical History:     Allergies   Allergen Reactions    Penicillins Other (See Comments)     Passed out after INJECTION as teenager, no rash, no reactive arthritis, no respiratory or cardiac symptoms. Does not appear to be allergic reaction.     Past Medical History:   Diagnosis Date    Diabetes mellitus     Hypertension     Urinary retention      Past Surgical History:   Procedure Laterality Date    BACK SURGERY      COLONOSCOPY N/A 11/21/2024    Procedure: COLONOSCOPY;  Surgeon: Richard Lopez MD;  Location: [facility] ENDOSCOPY;  Service: Gastroenterology;  Laterality: N/A;  HEMORRHOIDS    FRACTURE SURGERY      NECK SURGERY       Family History   Problem Relation Age of Onset    Colon cancer Neg Hx        Home Medications:  Prior to Admission medications    Medication Sig Start Date End Date Taking? Authorizing Provider   fenofibrate 160 MG tablet Take 1 tablet by mouth Daily. 5/14/21   Provider, MD Nikky   hydroCHLOROthiazide  (HYDRODIURIL) 25 MG tablet Take 1 tablet by mouth Daily. 4/25/21   Nikky Hicks MD   lisinopril (PRINIVIL,ZESTRIL) 20 MG tablet Take 1 tablet by mouth Daily. 4/27/21   Nikky Hicks MD   Magnesium Oxide -Mg Supplement 400 (240 Mg) MG tablet Take 1 tablet by mouth Daily. 3/26/25   Nikky Hicks MD   metFORMIN (GLUCOPHAGE) 500 MG tablet Take 2 tablets by mouth 2 (Two) Times a Day With Meals. 5/10/21   Provider, Historical, MD   OneTouch Delica Lancets 30G misc Inject 1 each under the skin into the appropriate area as directed 4 (Four) Times a Day. 3/19/21   Provider, Historical, MD   OneTouch Verio test strip CHECK BLOOD GLUCOSE FOUR TIMES DAILY 4/27/21   Nikky Hicks MD   pravastatin (PRAVACHOL) 80 MG tablet Take 1 tablet by mouth Daily. 5/10/21   Nikky Hicks MD   tamsulosin (FLOMAX) 0.4 MG capsule 24 hr capsule 1 capsule. 6/17/21   Nikky Hicks MD   Tradjenta 5 MG tablet tablet Take 1 tablet by mouth Daily. 6/7/21   Nikky Hicks MD   vitamin D (ERGOCALCIFEROL) 1.25 MG (78456 UT) capsule capsule Take 1 capsule by mouth 1 (One) Time Per Week. 4/7/21   Nikky Hicks MD        Social History:   Social History     Tobacco Use    Smoking status: Never    Smokeless tobacco: Never   Vaping Use    Vaping status: Never Used   Substance Use Topics    Alcohol use: Yes     Comment: rare         Review of Systems:  Review of Systems   Constitutional:  Negative for chills and fever.   HENT:  Negative for congestion, ear pain and sore throat.    Eyes:  Negative for pain.   Respiratory:  Negative for cough, chest tightness and shortness of breath.    Cardiovascular:  Positive for chest pain.   Gastrointestinal:  Negative for abdominal pain, diarrhea, nausea and vomiting.   Genitourinary:  Negative for flank pain and hematuria.   Musculoskeletal:  Positive for neck pain. Negative for joint swelling.   Skin:  Negative for pallor.   Neurological:  Negative for  "seizures and headaches.   All other systems reviewed and are negative.       Physical Exam:  BP (!) 147/121   Pulse 90   Temp 99 °F (37.2 °C) (Oral)   Resp 17   Ht 177.8 cm (70\")   Wt 88.6 kg (195 lb 5.2 oz)   SpO2 99%   BMI 28.03 kg/m²     Physical Exam  Constitutional:       Appearance: Normal appearance.   HENT:      Head: Normocephalic and atraumatic.      Nose: Nose normal.      Mouth/Throat:      Mouth: Mucous membranes are moist.   Eyes:      Extraocular Movements: Extraocular movements intact.      Conjunctiva/sclera: Conjunctivae normal.   Neck:      Comments: Cervical brace in place. Posterior incision covered with bandage, no surrounding erythema no purulent drainage  Cardiovascular:      Rate and Rhythm: Normal rate and regular rhythm.      Pulses: Normal pulses.      Heart sounds: Normal heart sounds.   Pulmonary:      Effort: Pulmonary effort is normal.      Breath sounds: Normal breath sounds.   Abdominal:      General: There is no distension.      Palpations: Abdomen is soft.      Tenderness: There is no abdominal tenderness.   Musculoskeletal:         General: Normal range of motion.   Skin:     General: Skin is warm and dry.      Capillary Refill: Capillary refill takes less than 2 seconds.   Neurological:      General: No focal deficit present.      Mental Status: He is alert and oriented to person, place, and time.   Psychiatric:         Mood and Affect: Mood normal.         Behavior: Behavior normal.     Patient moving all 4 extremities.              Medical Decision Making:      Comorbidities that affect care:    Diabetes, Hypertension    External Notes reviewed:    Previous Clinic Note: Patient was seen by cardiology on 7/10/2025 for chest pain and hypertension.      The following orders were placed and all results were independently analyzed by me:  Orders Placed This Encounter   Procedures    CT Cervical Spine Without Contrast    CT Angiogram Chest Pulmonary Embolism    Comprehensive " Metabolic Panel    [place] Draw    CBC Auto Differential    High Sensitivity Troponin T    High Sensitivity Troponin T 1Hr    IP General Consult (Use specialty-specific consult if known)    ECG 12 Lead Chest Pain    CBC & Differential    Green Top (Gel)    Lavender Top    Gold Top - SST    Light Blue Top       Medications Given in the Emergency Department:  Medications   HYDROmorphone (DILAUDID) injection 1 mg (1 mg Intravenous Given 7/21/25 0545)   iopamidol (ISOVUE-370) 76 % injection 100 mL (100 mL Intravenous Given 7/21/25 0734)   HYDROcodone-acetaminophen (NORCO) 5-325 MG per tablet 2 tablet (2 tablets Oral Given 7/21/25 1018)        ED Course:         Labs:    Lab Results (last 24 hours)       Procedure Component Value Units Date/Time    CBC & Differential [[id number]]  (Abnormal) Collected: 07/21/25 0449    Specimen: Blood Updated: 07/21/25 0500    Narrative:      The following orders were created for panel order CBC & Differential.  Procedure                               Abnormality         Status                     ---------                               -----------         ------                     CBC Auto Differential[[id number]]        Abnormal            Final result                 Please view results for these tests on the individual orders.    Comprehensive Metabolic Panel [[id number]]  (Abnormal) Collected: 07/21/25 0449    Specimen: Blood Updated: 07/21/25 0513     Glucose 176 mg/dL      BUN 16.1 mg/dL      Creatinine 0.96 mg/dL      Sodium 131 mmol/L      Potassium 3.9 mmol/L      Chloride 92 mmol/L      CO2 27.2 mmol/L      Calcium 9.8 mg/dL      Total Protein 7.4 g/dL      Albumin 4.0 g/dL      ALT (SGPT) 10 U/L      AST (SGOT) 16 U/L      Alkaline Phosphatase 36 U/L      Total Bilirubin 0.7 mg/dL      Globulin 3.4 gm/dL      A/G Ratio 1.2 g/dL      BUN/Creatinine Ratio 16.8     Anion Gap 11.8 mmol/L      eGFR 84.5 mL/min/1.73     Narrative:      GFR Categories in Chronic Kidney Disease (CKD)               GFR Category          GFR (mL/min/1.73)    Interpretation  G1                    90 or greater        Normal or high (1)  G2                    60-89                Mild decrease (1)  G3a                   45-59                Mild to moderate decrease  G3b                   30-44                Moderate to severe decrease  G4                    15-29                Severe decrease  G5                    14 or less           Kidney failure    (1)In the absence of evidence of kidney disease, neither GFR category G1 or G2 fulfill the criteria for CKD.    eGFR calculation 2021 CKD-EPI creatinine equation, which does not include race as a factor    CBC Auto Differential [[id number]]  (Abnormal) Collected: 07/21/25 0449    Specimen: Blood Updated: 07/21/25 0500     WBC 11.98 10*3/mm3      RBC 4.31 10*6/mm3      Hemoglobin 12.5 g/dL      Hematocrit 36.7 %      MCV 85.2 fL      MCH 29.0 pg      MCHC 34.1 g/dL      RDW 12.3 %      RDW-SD 38.4 fl      MPV 9.9 fL      Platelets 208 10*3/mm3      Neutrophil % 77.3 %      Lymphocyte % 10.7 %      Monocyte % 10.3 %      Eosinophil % 0.7 %      Basophil % 0.3 %      Immature Grans % 0.7 %      Neutrophils, Absolute 9.28 10*3/mm3      Lymphocytes, Absolute 1.28 10*3/mm3      Monocytes, Absolute 1.23 10*3/mm3      Eosinophils, Absolute 0.08 10*3/mm3      Basophils, Absolute 0.03 10*3/mm3      Immature Grans, Absolute 0.08 10*3/mm3      nRBC 0.0 /100 WBC     High Sensitivity Troponin T [[id number]]  (Normal) Collected: 07/21/25 0449    Specimen: Blood Updated: 07/21/25 0642     HS Troponin T 17 ng/L     Narrative:      High Sensitive Troponin T Reference Range:  <14.0 ng/L- Negative Female for AMI  <22.0 ng/L- Negative Male for AMI  >=14 - Abnormal Female indicating possible myocardial injury.  >=22 - Abnormal Male indicating possible myocardial injury.   Clinicians would have to utilize clinical acumen, EKG, Troponin, and serial changes to determine if it is an Acute  Myocardial Infarction or myocardial injury due to an underlying chronic condition.         High Sensitivity Troponin T 1Hr [[id number]]  (Normal) Collected: 07/21/25 0802    Specimen: Blood from Arm, Left Updated: 07/21/25 0828     HS Troponin T 16 ng/L      Troponin T Numeric Delta -1 ng/L     Narrative:      High Sensitive Troponin T Reference Range:  <14.0 ng/L- Negative Female for AMI  <22.0 ng/L- Negative Male for AMI  >=14 - Abnormal Female indicating possible myocardial injury.  >=22 - Abnormal Male indicating possible myocardial injury.   Clinicians would have to utilize clinical acumen, EKG, Troponin, and serial changes to determine if it is an Acute Myocardial Infarction or myocardial injury due to an underlying chronic condition.                  Imaging:    CT Cervical Spine Without Contrast  Result Date: 7/21/2025  CT CERVICAL SPINE WO CONTRAST Date of Exam: 7/21/2025 7:28 AM EDT Indication: pain neck pain. Postop day 3 from posterior cervical fusion Comparison: Radiograph 11/20/2014 Technique: Axial CT images were obtained of the cervical spine without contrast administration.  Reconstructed coronal and sagittal images were also obtained. Automated exposure control and iterative construction methods were used. Findings: There is expected postoperative gas scattered throughout the posterior paraspinal soft tissues. There is a fluid collection posteriorly at the C4-C6 levels which could represent postoperative seroma/hematoma or pseudomeningocele. This measures approximate 3.4 x 2.1 cm in size. Patient is status post posterior fusion from the C3-T1 levels with posterior sharla and screw fixation. The left posterior fixation screw at the C3/4 level is noted to be within the facet joint. No acute fracture or malalignment. Craniovertebral junction is within normal limits. Patient is status post laminectomy from the C4-C6 levels. Visualized lung apices are clear.     Impression: 1. Interval posterior fusion from  the C3-T1 levels and laminectomy from the C4-C6 levels 2. Left posterior fixation screw at C3/4 level is noted to be within the facet joint. 3. Posterior fluid collection from C4-C6 levels likely postoperative seroma/hematoma or less likely pseudomeningocele. Electronically Signed: Geovani Jenkins MD  7/21/2025 8:01 AM EDT  Workstation ID: [id number]    CT Angiogram Chest Pulmonary Embolism  Result Date: 7/21/2025  CT ANGIOGRAM CHEST PULMONARY EMBOLISM Date of Exam: 7/21/2025 7:30 AM EDT Indication: Pulmonary Embolism. Comparison: None available. Technique: Axial CT images were obtained of the chest after the uneventful intravenous administration of iodinated contrast utilizing pulmonary embolism protocol.  Reconstructed coronal and sagittal images were also obtained. In addition, a 3-D volume rendered image was created for interpretation.  Automated exposure control and iterative construction methods were used. Findings: Pulmonary Arteries: Adequately opacified. No emboli Malu/mediastinum: No adenopathy. Thoracic aorta normal in caliber. Coronary artery calcification. No pericardial effusion Lungs/pleura: No suspicious consolidation or edema. Subsegmental atelectasis in the left lower lobe. Dependent atelectasis in the lower lobes. Paraspinal atelectasis in the right lower lobe secondary to bulky DISH type ossification. No pleural effusion. No pneumothorax Upper Abdomen: Unremarkable Bones/soft tissues: No acute bony abnormality. Status post cervical fusion, with a few locules of air in the upper thoracic paraspinous muscles related to recent surgical procedure     1. No evidence of pulmonary embolism 2. Atelectasis in the left lower lobe 3. Coronary artery atherosclerosis Electronically Signed: Ryder Lambert  7/21/2025 7:59 AM EDT  Workstation ID: [id number]        Differential Diagnosis and Discussion:    Chest Pain:  Based on the patient's signs and symptoms, I considered aortic dissection, myocardial infaction,  pulmonary embolism, cardiac tamponade, pericarditis, pneumothorax, musculoskeletal chest pain and other differential diagnosis as an etiology of the patient's chest pain.   Extremity Pain: Differential diagnosis includes but is not limited to soft tissue sprain, tendonitis, tendon injury, dislocation, fracture, deep vein thrombosis, arterial insufficiency, osteoarthritis, bursitis, and ligamentous damage.  Neck Pain: The patient presents with neck pain. My differential diagnosis includes but is not limited to acute spinal epidural abscess, acute spinal epidural bleed, meningitis, musculoskeletal neck pain, spinal fracture, and osteoarthritis.     PROCEDURES:    Labs were collected in the emergency department and all labs were reviewed and interpreted by me.  X-ray were performed in the emergency department and all X-ray impressions were independently interpreted by me.  An EKG was performed and the EKG was interpreted by me.  CT scan was performed in the emergency department and the CT scan radiology impression was interpreted by me.    ECG 12 Lead Chest Pain   Preliminary Result   HEART RATE=87  bpm   RR Pqyigeqg=415  ms   MO Llcsdkbf=319  ms   P Horizontal Axis=7  deg   P Front Axis=58  deg   QRSD Interval=80  ms   QT Daepxqws=580  ms   GVeF=813  ms   QRS Axis=39  deg   T Wave Axis=37  deg   - ABNORMAL ECG -   Sinus rhythm   Atrial premature complexes in couplets   Abnormal R-wave progression, early transition   Date and Time of Study:2025-07-21 08:03:57          Procedures    MDM     Amount and/or Complexity of Data Reviewed  Clinical lab tests: reviewed  Tests in the radiology section of CPT®: reviewed  Tests in the medicine section of CPT®: reviewed  Decide to obtain previous medical records or to obtain history from someone other than the patient: yes       This patient is a 69-year-old male postop day 3 or 4 following C-spine fusion surgery at [facility] now presenting with intractable postop cervical  spine pain rating to the chest and right arm.    We did a CT of the cervical spine showing essentially postsurgical changes and small seroma or hematoma posteriorly, and also CT of the chest with contrast that was negative for PE or any acute findings.    We had difficulty getting his pain under control and he was unable to move or get up from the ED stretcher when we went to discharge him due to severe pain.    Troponins and EKG are negative.                Patient Care Considerations:          Consultants/Shared Management Plan:    Consultant: I spoke with the spine surgery fellow (Dr. Apple) who is on-call for Dr. Renato Landa who performed the surgery at [facility] spine [place] and they want to see the patient in the ER and I also spoke with the accepting emergency physician, Dr. Faustin.    Social Determinants of Health:    Patient has presented with family members who are responsible, reliable and will ensure follow up care.      Disposition and Care Coordination:    Transferred: Through independent evaluation of the patient's history, physical, and imperical data, the patient meets criteria to be transferred to another hospital for evaluation/admission.        Final diagnoses:   Postoperative pain after spinal surgery   Intractable pain        ED Disposition       ED Disposition   Transfer to Another Facility     Condition   --    Comment   Jluis's               This medical record created using voice recognition software.             Gary Johnson MD  07/21/25 [id number]

## 2025-08-09 LAB
QT INTERVAL: 341 MS
QTC INTERVAL: 404 MS

## (undated) DEVICE — Device: Brand: DEFENDO AIR/WATER/SUCTION AND BIOPSY VALVE

## (undated) DEVICE — Device

## (undated) DEVICE — SOL IRRG H2O PL/BG 1000ML STRL

## (undated) DEVICE — SOLIDIFIER LIQLOC PLS 1500CC BT